# Patient Record
Sex: MALE | Race: WHITE | Employment: OTHER | URBAN - NONMETROPOLITAN AREA
[De-identification: names, ages, dates, MRNs, and addresses within clinical notes are randomized per-mention and may not be internally consistent; named-entity substitution may affect disease eponyms.]

---

## 2018-04-06 ENCOUNTER — HOSPITAL ENCOUNTER (OUTPATIENT)
Age: 58
Discharge: HOME | End: 2018-04-06
Payer: COMMERCIAL

## 2018-04-06 LAB
ALBUMIN SERPL-MCNC: 4 G/DL (ref 3.5–5.1)
ALP BLD-CCNC: 71 U/L (ref 38–126)
ALT SERPL-CCNC: 19 U/L (ref 11–66)
ANION GAP SERPL CALCULATED.3IONS-SCNC: 13 MEQ/L (ref 8–16)
AST SERPL-CCNC: 17 U/L (ref 5–40)
BASOPHILS # BLD: 0.5 %
BASOPHILS ABSOLUTE: 0 THOU/MM3 (ref 0–0.1)
BILIRUB SERPL-MCNC: 0.7 MG/DL (ref 0.3–1.2)
BILIRUBIN DIRECT: < 0.2 MG/DL (ref 0–0.3)
BUN BLDV-MCNC: 12 MG/DL (ref 7–22)
CALCIUM SERPL-MCNC: 8.9 MG/DL (ref 8.5–10.5)
CHLORIDE BLD-SCNC: 102 MEQ/L (ref 98–111)
CHOLESTEROL, FASTING: 235 MG/DL (ref 100–199)
CO2: 28 MEQ/L (ref 23–33)
CREAT SERPL-MCNC: 0.9 MG/DL (ref 0.4–1.2)
EOSINOPHIL # BLD: 2.2 %
EOSINOPHILS ABSOLUTE: 0.1 THOU/MM3 (ref 0–0.4)
GFR SERPL CREATININE-BSD FRML MDRD: 86 ML/MIN/1.73M2
GLUCOSE FASTING: 90 MG/DL (ref 70–108)
HCT VFR BLD CALC: 48.5 % (ref 42–52)
HDLC SERPL-MCNC: 40 MG/DL
HEMOGLOBIN: 16.4 GM/DL (ref 14–18)
LDL CHOLESTEROL CALCULATED: 162 MG/DL
LYMPHOCYTES # BLD: 28.8 %
LYMPHOCYTES ABSOLUTE: 1.5 THOU/MM3 (ref 1–4.8)
MCH RBC QN AUTO: 29.1 PG (ref 27–31)
MCHC RBC AUTO-ENTMCNC: 33.8 GM/DL (ref 33–37)
MCV RBC AUTO: 85.9 FL (ref 80–94)
MONOCYTES # BLD: 10.7 %
MONOCYTES ABSOLUTE: 0.6 THOU/MM3 (ref 0.4–1.3)
NUCLEATED RED BLOOD CELLS: 0 /100 WBC
PDW BLD-RTO: 13.6 % (ref 11.5–14.5)
PLATELET # BLD: 273 THOU/MM3 (ref 130–400)
PMV BLD AUTO: 9.5 FL (ref 7.4–10.4)
POTASSIUM SERPL-SCNC: 4.1 MEQ/L (ref 3.5–5.2)
PROSTATE SPECIFIC ANTIGEN: 1.54 NG/ML (ref 0–1)
RBC # BLD: 5.64 MILL/MM3 (ref 4.7–6.1)
SEG NEUTROPHILS: 57.8 %
SEGMENTED NEUTROPHILS ABSOLUTE COUNT: 3.1 THOU/MM3 (ref 1.8–7.7)
SODIUM BLD-SCNC: 143 MEQ/L (ref 135–145)
TOTAL PROTEIN: 7 G/DL (ref 6.1–8)
TRIGLYCERIDE, FASTING: 163 MG/DL (ref 0–199)
WBC # BLD: 5.3 THOU/MM3 (ref 4.8–10.8)

## 2018-04-06 PROCEDURE — 36415 COLL VENOUS BLD VENIPUNCTURE: CPT

## 2018-04-06 PROCEDURE — G0103 PSA SCREENING: HCPCS

## 2018-04-06 PROCEDURE — 80076 HEPATIC FUNCTION PANEL: CPT

## 2018-04-06 PROCEDURE — 80061 LIPID PANEL: CPT

## 2018-04-06 PROCEDURE — 80048 BASIC METABOLIC PNL TOTAL CA: CPT

## 2018-04-06 PROCEDURE — 85025 COMPLETE CBC W/AUTO DIFF WBC: CPT

## 2020-09-02 ENCOUNTER — OFFICE VISIT (OUTPATIENT)
Dept: SURGERY | Age: 60
End: 2020-09-02
Payer: COMMERCIAL

## 2020-09-02 ENCOUNTER — HOSPITAL ENCOUNTER (OUTPATIENT)
Age: 60
Setting detail: SPECIMEN
Discharge: HOME | End: 2020-09-02
Payer: COMMERCIAL

## 2020-09-02 VITALS
DIASTOLIC BLOOD PRESSURE: 80 MMHG | HEIGHT: 69 IN | TEMPERATURE: 97.8 F | SYSTOLIC BLOOD PRESSURE: 122 MMHG | BODY MASS INDEX: 26.48 KG/M2 | OXYGEN SATURATION: 100 % | HEART RATE: 79 BPM | RESPIRATION RATE: 18 BRPM | WEIGHT: 178.8 LBS

## 2020-09-02 PROCEDURE — U0003 INFECTIOUS AGENT DETECTION BY NUCLEIC ACID (DNA OR RNA); SEVERE ACUTE RESPIRATORY SYNDROME CORONAVIRUS 2 (SARS-COV-2) (CORONAVIRUS DISEASE [COVID-19]), AMPLIFIED PROBE TECHNIQUE, MAKING USE OF HIGH THROUGHPUT TECHNOLOGIES AS DESCRIBED BY CMS-2020-01-R: HCPCS

## 2020-09-02 PROCEDURE — 99243 OFF/OP CNSLTJ NEW/EST LOW 30: CPT | Performed by: SURGERY

## 2020-09-02 RX ORDER — SIMVASTATIN 20 MG
20 TABLET ORAL NIGHTLY
COMMUNITY
End: 2022-04-19 | Stop reason: ALTCHOICE

## 2020-09-03 ASSESSMENT — ENCOUNTER SYMPTOMS
PHOTOPHOBIA: 0
STRIDOR: 0
ABDOMINAL DISTENTION: 0
RECTAL PAIN: 0
BLOOD IN STOOL: 0
ABDOMINAL PAIN: 1
ANAL BLEEDING: 0
DIARRHEA: 0
EYE REDNESS: 0
VOMITING: 0
VOICE CHANGE: 0
EYE DISCHARGE: 0
BACK PAIN: 0
EYE PAIN: 0
CHOKING: 0
CONSTIPATION: 0
COUGH: 0
SHORTNESS OF BREATH: 0
COLOR CHANGE: 0
ALLERGIC/IMMUNOLOGIC NEGATIVE: 1
FACIAL SWELLING: 0
EYE ITCHING: 0
RHINORRHEA: 0
TROUBLE SWALLOWING: 0
SINUS PRESSURE: 0
WHEEZING: 0
NAUSEA: 0
CHEST TIGHTNESS: 0
SORE THROAT: 0
APNEA: 0

## 2020-09-03 NOTE — PROGRESS NOTES
Subjective:      Patient ID: Jey Bush is a 56 y.o. male. Chief Complaint   Patient presents with    Surgical Consult     New patient-referred by Dr Ammon Osorio hernia     HPI  Nitza Burgos is a 78-year-old male who presents for initial evaluation of a periumbilical bulge with discomfort. Has a known incarcerated umbilical hernia. The bulge is been there for a little bit over a year. It is gradually increased in size. No longer reducible. Tender with increased activity, lifting and bending over. Improves with rest.  No skin changes. No history of trauma to the area. No drainage. No history of infection. He denies any surgery on the abdominal wall that he remembers. Tolerating diet. Normal bowel function. No hematochezia or melena. No urinary complaints. No chest pain or shortness of breath. Review of Systems   Constitutional: Negative for activity change, appetite change, chills, diaphoresis, fatigue, fever and unexpected weight change. HENT: Negative for congestion, dental problem, drooling, ear discharge, ear pain, facial swelling, hearing loss, mouth sores, nosebleeds, postnasal drip, rhinorrhea, sinus pressure, sneezing, sore throat, tinnitus, trouble swallowing and voice change. Eyes: Negative for photophobia, pain, discharge, redness, itching and visual disturbance. Respiratory: Negative for apnea, cough, choking, chest tightness, shortness of breath, wheezing and stridor. Cardiovascular: Negative for chest pain, palpitations and leg swelling. Gastrointestinal: Positive for abdominal pain (umbilical). Negative for abdominal distention, anal bleeding, blood in stool, constipation, diarrhea, nausea, rectal pain and vomiting. Endocrine: Negative.     Genitourinary: Negative for decreased urine volume, difficulty urinating, discharge, dysuria, enuresis, flank pain, frequency, genital sores, hematuria, penile pain, penile swelling, scrotal swelling, testicular pain and urgency. Musculoskeletal: Negative for arthralgias, back pain, gait problem, joint swelling, myalgias, neck pain and neck stiffness. Skin: Negative for color change, pallor, rash and wound. Allergic/Immunologic: Negative. Neurological: Negative for dizziness, tremors, seizures, syncope, facial asymmetry, speech difficulty, weakness, light-headedness, numbness and headaches. Hematological: Negative for adenopathy. Does not bruise/bleed easily. Psychiatric/Behavioral: Negative for agitation, behavioral problems, confusion, decreased concentration, dysphoric mood, hallucinations, self-injury, sleep disturbance and suicidal ideas. The patient is not nervous/anxious and is not hyperactive. Past Medical History:   Diagnosis Date    Hyperlipidemia     Umbilical hernia 52/4295       Past Surgical History:   Procedure Laterality Date    [facility]    HEMORRHOID SURGERY  2005    Dr Rollen Bernheim       Current Outpatient Medications   Medication Sig Dispense Refill    simvastatin (ZOCOR) 20 MG tablet Take 20 mg by mouth nightly       No current facility-administered medications for this visit.         No Known Allergies    Family History   Problem Relation Age of Onset    Dementia Mother     No Known Problems Father     No Known Problems Sister     No Known Problems Brother     No Known Problems Maternal Grandmother     No Known Problems Maternal Grandfather     No Known Problems Paternal Grandmother     No Known Problems Paternal Grandfather     No Known Problems Brother     No Known Problems Sister     No Known Problems Sister     No Known Problems Sister        Social History     Socioeconomic History    Marital status:      Spouse name: Not on file    Number of children: Not on file    Years of education: Not on file    Highest education level: Not on file   Occupational History    Not on file   Social Needs    Financial resource strain: Not on file    Food insecurity     Worry: Not on file     Inability: Not on file    Transportation needs     Medical: Not on file     Non-medical: Not on file   Tobacco Use    Smoking status: Never Smoker    Smokeless tobacco: Never Used   Substance and Sexual Activity    Alcohol use: Yes     Comment: occ drinker    Drug use: Never    Sexual activity: Not on file   Lifestyle    Physical activity     Days per week: Not on file     Minutes per session: Not on file    Stress: Not on file   Relationships    Social connections     Talks on phone: Not on file     Gets together: Not on file     Attends Judaism service: Not on file     Active member of club or organization: Not on file     Attends meetings of clubs or organizations: Not on file     Relationship status: Not on file    Intimate partner violence     Fear of current or ex partner: Not on file     Emotionally abused: Not on file     Physically abused: Not on file     Forced sexual activity: Not on file   Other Topics Concern    Not on file   Social History Narrative    Not on file     Vitals:    09/02/20 1405   BP: 122/80   Pulse: 79   Resp: 18   Temp: 97.8 °F (36.6 °C)   SpO2: 100%     Body mass index is 26.4 kg/m². Objective:   Physical Exam  Vitals signs reviewed. Constitutional:       General: He is not in acute distress. Appearance: He is well-developed. He is not diaphoretic. HENT:      Head: Normocephalic and atraumatic. Right Ear: External ear normal.      Left Ear: External ear normal.      Nose: Nose normal.   Eyes:      General: No scleral icterus. Right eye: No discharge. Left eye: No discharge. Conjunctiva/sclera: Conjunctivae normal.   Neck:      Musculoskeletal: Normal range of motion and neck supple. Cardiovascular:      Rate and Rhythm: Normal rate and regular rhythm. Heart sounds: Normal heart sounds.    Pulmonary:      Effort: Pulmonary effort is normal. No respiratory distress. Breath sounds: Normal breath sounds. No wheezing or rales. Chest:      Chest wall: No tenderness. Abdominal:      General: Bowel sounds are normal. There is no distension. Palpations: Abdomen is soft. There is no mass. Tenderness: There is abdominal tenderness in the periumbilical area. There is no guarding or rebound. Hernia: A hernia is present. Hernia is present in the umbilical area. Musculoskeletal: Normal range of motion. General: No tenderness. Skin:     General: Skin is warm and dry. Coloration: Skin is not pale. Findings: No erythema or rash. Neurological:      Mental Status: He is alert and oriented to person, place, and time. Cranial Nerves: No cranial nerve deficit. Psychiatric:         Behavior: Behavior normal.         Thought Content: Thought content normal.         Judgment: Judgment normal.       Lab Results   Component Value Date    WBC 5.3 04/06/2018    HGB 16.4 04/06/2018    HCT 48.5 04/06/2018    MCV 85.9 04/06/2018     04/06/2018     Lab Results   Component Value Date     04/06/2018    K 4.1 04/06/2018     04/06/2018    CO2 28 04/06/2018    BUN 12 04/06/2018    CREATININE 0.9 04/06/2018    CALCIUM 8.9 04/06/2018      Imaging - none    There is no problem list on file for this patient. Assessment:      1. Incarcerated umbilical hernia      Plan:      1. Schedule Jarad for repair incarcerated umbilical hernia with mesh. 2. He will undergo pre-operative clearance per anesthesia guidelines with risk factors listed under the past medical history diagnosis & problem list.  3. The risks, benefits and alternatives were discussed with Jarad including non-operative management. The pros and cons of robotic, laparoscopic and open techniques were discussed. The pros and cons of mesh insertion were discussed. All questions answered. He understands and wishes to proceed with surgical intervention.   4. Restrictions discussed with Tray Eddy and he expresses understanding. 5. He is advised to call back directly if there are further questions/concerns, or if his symptoms worsen prior to surgery.           Tosha Spencer MD

## 2020-09-04 LAB — SARS-COV-2: NOT DETECTED

## 2020-09-06 NOTE — H&P
Subjective:   Patient ID: Kamran Lopez is a 56 y.o. male. Chief Complaint   Patient presents with    Surgical Consult     New patient-referred by Dr Brooke Kruger hernia   HPI   Eugenia Menard is a 24-year-old male who presents for initial evaluation of a periumbilical bulge with discomfort. Has a known incarcerated umbilical hernia. The bulge is been there for a little bit over a year. It is gradually increased in size. No longer reducible. Tender with increased activity, lifting and bending over. Improves with rest. No skin changes. No history of trauma to the area. No drainage. No history of infection. He denies any surgery on the abdominal wall that he remembers. Tolerating diet. Normal bowel function. No hematochezia or melena. No urinary complaints. No chest pain or shortness of breath. Review of Systems   Constitutional: Negative for activity change, appetite change, chills, diaphoresis, fatigue, fever and unexpected weight change. HENT: Negative for congestion, dental problem, drooling, ear discharge, ear pain, facial swelling, hearing loss, mouth sores, nosebleeds, postnasal drip, rhinorrhea, sinus pressure, sneezing, sore throat, tinnitus, trouble swallowing and voice change. Eyes: Negative for photophobia, pain, discharge, redness, itching and visual disturbance. Respiratory: Negative for apnea, cough, choking, chest tightness, shortness of breath, wheezing and stridor. Cardiovascular: Negative for chest pain, palpitations and leg swelling. Gastrointestinal: Positive for abdominal pain (umbilical). Negative for abdominal distention, anal bleeding, blood in stool, constipation, diarrhea, nausea, rectal pain and vomiting. Endocrine: Negative. Genitourinary: Negative for decreased urine volume, difficulty urinating, discharge, dysuria, enuresis, flank pain, frequency, genital sores, hematuria, penile pain, penile swelling, scrotal swelling, testicular pain and urgency. Musculoskeletal: Negative for arthralgias, back pain, gait problem, joint swelling, myalgias, neck pain and neck stiffness. Skin: Negative for color change, pallor, rash and wound. Allergic/Immunologic: Negative. Neurological: Negative for dizziness, tremors, seizures, syncope, facial asymmetry, speech difficulty, weakness, light-headedness, numbness and headaches. Hematological: Negative for adenopathy. Does not bruise/bleed easily. Psychiatric/Behavioral: Negative for agitation, behavioral problems, confusion, decreased concentration, dysphoric mood, hallucinations, self-injury, sleep disturbance and suicidal ideas. The patient is not nervous/anxious and is not hyperactive. Past Medical History        Past Medical History:   Diagnosis Date    Hyperlipidemia     Umbilical hernia 43/0735     Past Surgical History         Past Surgical History:   Procedure Laterality Date    [facility]    HEMORRHOID SURGERY  2005    Dr Meg Domínguez     Current Facility-Administered Medications          Current Outpatient Medications   Medication Sig Dispense Refill    simvastatin (ZOCOR) 20 MG tablet Take 20 mg by mouth nightly     No current facility-administered medications for this visit.      No Known Allergies   Family History         Family History   Problem Relation Age of Onset    Dementia Mother     No Known Problems Father     No Known Problems Sister     No Known Problems Brother     No Known Problems Maternal Grandmother     No Known Problems Maternal Grandfather     No Known Problems Paternal Grandmother     No Known Problems Paternal Grandfather     No Known Problems Brother     No Known Problems Sister     No Known Problems Sister     No Known Problems Sister      Social History         Socioeconomic History    Marital status:      Spouse name: Not on file    Number of children: Not on file    Years of education: Not on file  Highest education level: Not on file   Occupational History    Not on file   Social Needs    Financial resource strain: Not on file    Food insecurity     Worry: Not on file     Inability: Not on file    Transportation needs     Medical: Not on file     Non-medical: Not on file   Tobacco Use    Smoking status: Never Smoker    Smokeless tobacco: Never Used   Substance and Sexual Activity    Alcohol use: Yes     Comment: occ drinker    Drug use: Never    Sexual activity: Not on file   Lifestyle    Physical activity     Days per week: Not on file     Minutes per session: Not on file    Stress: Not on file   Relationships    Social connections     Talks on phone: Not on file     Gets together: Not on file     Attends Congregational service: Not on file     Active member of club or organization: Not on file     Attends meetings of clubs or organizations: Not on file     Relationship status: Not on file    Intimate partner violence     Fear of current or ex partner: Not on file     Emotionally abused: Not on file     Physically abused: Not on file     Forced sexual activity: Not on file   Other Topics Concern    Not on file   Social History Narrative    Not on file         Vitals:    09/02/20 1405   BP: 122/80   Pulse: 79   Resp: 18   Temp: 97.8 °F (36.6 °C)   SpO2: 100%   Body mass index is 26.4 kg/m². Objective:   Physical Exam   Vitals signs reviewed. Constitutional:   General: He is not in acute distress. Appearance: He is well-developed. He is not diaphoretic. HENT:   Head: Normocephalic and atraumatic. Right Ear: External ear normal.   Left Ear: External ear normal.   Nose: Nose normal.   Eyes:   General: No scleral icterus. Right eye: No discharge. Left eye: No discharge. Conjunctiva/sclera: Conjunctivae normal.   Neck:   Musculoskeletal: Normal range of motion and neck supple. Cardiovascular:   Rate and Rhythm: Normal rate and regular rhythm. Heart sounds: Normal heart sounds. Pulmonary:   Effort: Pulmonary effort is normal. No respiratory distress. Breath sounds: Normal breath sounds. No wheezing or rales. Chest:   Chest wall: No tenderness. Abdominal:   General: Bowel sounds are normal. There is no distension. Palpations: Abdomen is soft. There is no mass. Tenderness: There is abdominal tenderness in the periumbilical area. There is no guarding or rebound. Hernia: A hernia is present. Hernia is present in the umbilical area. Musculoskeletal: Normal range of motion. General: No tenderness. Skin:   General: Skin is warm and dry. Coloration: Skin is not pale. Findings: No erythema or rash. Neurological:   Mental Status: He is alert and oriented to person, place, and time. Cranial Nerves: No cranial nerve deficit. Psychiatric:   Behavior: Behavior normal.   Thought Content: Thought content normal.   Judgment: Judgment normal.           Lab Results   Component Value Date    WBC 5.3 04/06/2018    HGB 16.4 04/06/2018    HCT 48.5 04/06/2018    MCV 85.9 04/06/2018     04/06/2018           Lab Results   Component Value Date     04/06/2018    K 4.1 04/06/2018     04/06/2018    CO2 28 04/06/2018    BUN 12 04/06/2018    CREATININE 0.9 04/06/2018    CALCIUM 8.9 04/06/2018   Imaging - none   There is no problem list on file for this patient. Assessment:   1. Incarcerated umbilical hernia   Plan:   1. Schedule Jarad for repair incarcerated umbilical hernia with mesh. 2. He will undergo pre-operative clearance per anesthesia guidelines with risk factors listed under the past medical history diagnosis & problem list.   3. The risks, benefits and alternatives were discussed with Jarad including non-operative management. The pros and cons of robotic, laparoscopic and open techniques were discussed. The pros and cons of mesh insertion were discussed. All questions answered. He understands and wishes to proceed with surgical intervention.    4. Restrictions discussed with Wesly Chan and he expresses understanding. 5. He is advised to call back directly if there are further questions/concerns, or if his symptoms worsen prior to surgery.    Iggy Damico MD

## 2020-09-08 ENCOUNTER — HOSPITAL ENCOUNTER (OUTPATIENT)
Age: 60
Setting detail: OUTPATIENT SURGERY
Discharge: HOME | End: 2020-09-08
Attending: SURGERY | Admitting: SURGERY
Payer: COMMERCIAL

## 2020-09-08 ENCOUNTER — ANESTHESIA (OUTPATIENT)
Dept: OPERATING ROOM | Age: 60
End: 2020-09-08
Payer: COMMERCIAL

## 2020-09-08 ENCOUNTER — ANESTHESIA EVENT (OUTPATIENT)
Dept: OPERATING ROOM | Age: 60
End: 2020-09-08
Payer: COMMERCIAL

## 2020-09-08 VITALS
DIASTOLIC BLOOD PRESSURE: 92 MMHG | SYSTOLIC BLOOD PRESSURE: 150 MMHG | TEMPERATURE: 95.2 F | OXYGEN SATURATION: 98 % | RESPIRATION RATE: 9 BRPM

## 2020-09-08 VITALS
HEART RATE: 50 BPM | SYSTOLIC BLOOD PRESSURE: 149 MMHG | OXYGEN SATURATION: 99 % | WEIGHT: 174.6 LBS | HEIGHT: 69 IN | RESPIRATION RATE: 16 BRPM | TEMPERATURE: 98.4 F | BODY MASS INDEX: 25.86 KG/M2 | DIASTOLIC BLOOD PRESSURE: 79 MMHG

## 2020-09-08 PROCEDURE — 3600000012 HC SURGERY LEVEL 2 ADDTL 15MIN: Performed by: SURGERY

## 2020-09-08 PROCEDURE — 49587 REPAIR UMBILICAL HERN,5+Y/O,STRANG: CPT | Performed by: SURGERY

## 2020-09-08 PROCEDURE — 2500000003 HC RX 250 WO HCPCS: Performed by: SURGERY

## 2020-09-08 PROCEDURE — 6360000002 HC RX W HCPCS: Performed by: NURSE ANESTHETIST, CERTIFIED REGISTERED

## 2020-09-08 PROCEDURE — 6360000002 HC RX W HCPCS: Performed by: SURGERY

## 2020-09-08 PROCEDURE — 3700000000 HC ANESTHESIA ATTENDED CARE: Performed by: SURGERY

## 2020-09-08 PROCEDURE — 2580000003 HC RX 258: Performed by: SURGERY

## 2020-09-08 PROCEDURE — 2580000003 HC RX 258: Performed by: NURSE ANESTHETIST, CERTIFIED REGISTERED

## 2020-09-08 PROCEDURE — C1781 MESH (IMPLANTABLE): HCPCS | Performed by: SURGERY

## 2020-09-08 PROCEDURE — 2500000003 HC RX 250 WO HCPCS: Performed by: NURSE ANESTHETIST, CERTIFIED REGISTERED

## 2020-09-08 PROCEDURE — 7100000000 HC PACU RECOVERY - FIRST 15 MIN: Performed by: SURGERY

## 2020-09-08 PROCEDURE — 2709999900 HC NON-CHARGEABLE SUPPLY: Performed by: SURGERY

## 2020-09-08 PROCEDURE — 3600000002 HC SURGERY LEVEL 2 BASE: Performed by: SURGERY

## 2020-09-08 PROCEDURE — 7100000011 HC PHASE II RECOVERY - ADDTL 15 MIN: Performed by: SURGERY

## 2020-09-08 PROCEDURE — 7100000010 HC PHASE II RECOVERY - FIRST 15 MIN: Performed by: SURGERY

## 2020-09-08 PROCEDURE — 6360000002 HC RX W HCPCS

## 2020-09-08 PROCEDURE — 6360000002 HC RX W HCPCS: Performed by: ANESTHESIOLOGY

## 2020-09-08 PROCEDURE — 3700000001 HC ADD 15 MINUTES (ANESTHESIA): Performed by: SURGERY

## 2020-09-08 PROCEDURE — 7100000001 HC PACU RECOVERY - ADDTL 15 MIN: Performed by: SURGERY

## 2020-09-08 DEVICE — IMPLANTABLE DEVICE: Type: IMPLANTABLE DEVICE | Site: ABDOMEN | Status: FUNCTIONAL

## 2020-09-08 RX ORDER — NEOSTIGMINE METHYLSULFATE 5 MG/5 ML
SYRINGE (ML) INTRAVENOUS PRN
Status: DISCONTINUED | OUTPATIENT
Start: 2020-09-08 | End: 2020-09-08 | Stop reason: SDUPTHER

## 2020-09-08 RX ORDER — MORPHINE SULFATE 2 MG/ML
4 INJECTION, SOLUTION INTRAMUSCULAR; INTRAVENOUS
Status: DISCONTINUED | OUTPATIENT
Start: 2020-09-08 | End: 2020-09-08 | Stop reason: HOSPADM

## 2020-09-08 RX ORDER — PROMETHAZINE HYDROCHLORIDE 25 MG/1
12.5 TABLET ORAL EVERY 6 HOURS PRN
Status: DISCONTINUED | OUTPATIENT
Start: 2020-09-08 | End: 2020-09-08 | Stop reason: HOSPADM

## 2020-09-08 RX ORDER — MEPERIDINE HYDROCHLORIDE 25 MG/ML
12.5 INJECTION INTRAMUSCULAR; INTRAVENOUS; SUBCUTANEOUS EVERY 5 MIN PRN
Status: DISCONTINUED | OUTPATIENT
Start: 2020-09-08 | End: 2020-09-08 | Stop reason: HOSPADM

## 2020-09-08 RX ORDER — DIPHENHYDRAMINE HYDROCHLORIDE 50 MG/ML
12.5 INJECTION INTRAMUSCULAR; INTRAVENOUS
Status: DISCONTINUED | OUTPATIENT
Start: 2020-09-08 | End: 2020-09-08 | Stop reason: HOSPADM

## 2020-09-08 RX ORDER — MIDAZOLAM HYDROCHLORIDE 1 MG/ML
INJECTION INTRAMUSCULAR; INTRAVENOUS PRN
Status: DISCONTINUED | OUTPATIENT
Start: 2020-09-08 | End: 2020-09-08 | Stop reason: SDUPTHER

## 2020-09-08 RX ORDER — LABETALOL 20 MG/4 ML (5 MG/ML) INTRAVENOUS SYRINGE
5 EVERY 5 MIN PRN
Status: DISCONTINUED | OUTPATIENT
Start: 2020-09-08 | End: 2020-09-08 | Stop reason: HOSPADM

## 2020-09-08 RX ORDER — PROPOFOL 10 MG/ML
INJECTION, EMULSION INTRAVENOUS PRN
Status: DISCONTINUED | OUTPATIENT
Start: 2020-09-08 | End: 2020-09-08 | Stop reason: SDUPTHER

## 2020-09-08 RX ORDER — KETOROLAC TROMETHAMINE 10 MG/1
10 TABLET, FILM COATED ORAL EVERY 8 HOURS PRN
Qty: 15 TABLET | Refills: 0 | Status: SHIPPED | OUTPATIENT
Start: 2020-09-08 | End: 2020-09-21 | Stop reason: ALTCHOICE

## 2020-09-08 RX ORDER — OXYCODONE HYDROCHLORIDE 5 MG/1
5 TABLET ORAL EVERY 4 HOURS PRN
Status: DISCONTINUED | OUTPATIENT
Start: 2020-09-08 | End: 2020-09-08 | Stop reason: HOSPADM

## 2020-09-08 RX ORDER — MORPHINE SULFATE 2 MG/ML
2 INJECTION, SOLUTION INTRAMUSCULAR; INTRAVENOUS EVERY 5 MIN PRN
Status: DISCONTINUED | OUTPATIENT
Start: 2020-09-08 | End: 2020-09-08 | Stop reason: HOSPADM

## 2020-09-08 RX ORDER — FENTANYL CITRATE 50 UG/ML
50 INJECTION, SOLUTION INTRAMUSCULAR; INTRAVENOUS EVERY 5 MIN PRN
Status: DISCONTINUED | OUTPATIENT
Start: 2020-09-08 | End: 2020-09-08 | Stop reason: HOSPADM

## 2020-09-08 RX ORDER — LIDOCAINE HCL/PF 100 MG/5ML
SYRINGE (ML) INJECTION PRN
Status: DISCONTINUED | OUTPATIENT
Start: 2020-09-08 | End: 2020-09-08 | Stop reason: SDUPTHER

## 2020-09-08 RX ORDER — OXYCODONE HYDROCHLORIDE 5 MG/1
10 TABLET ORAL EVERY 4 HOURS PRN
Status: DISCONTINUED | OUTPATIENT
Start: 2020-09-08 | End: 2020-09-08 | Stop reason: HOSPADM

## 2020-09-08 RX ORDER — GLYCOPYRROLATE 1 MG/5 ML
SYRINGE (ML) INTRAVENOUS PRN
Status: DISCONTINUED | OUTPATIENT
Start: 2020-09-08 | End: 2020-09-08 | Stop reason: SDUPTHER

## 2020-09-08 RX ORDER — ONDANSETRON 2 MG/ML
4 INJECTION INTRAMUSCULAR; INTRAVENOUS EVERY 6 HOURS PRN
Status: DISCONTINUED | OUTPATIENT
Start: 2020-09-08 | End: 2020-09-08 | Stop reason: HOSPADM

## 2020-09-08 RX ORDER — SODIUM CHLORIDE 9 MG/ML
INJECTION, SOLUTION INTRAVENOUS CONTINUOUS PRN
Status: DISCONTINUED | OUTPATIENT
Start: 2020-09-08 | End: 2020-09-08 | Stop reason: SDUPTHER

## 2020-09-08 RX ORDER — ONDANSETRON 2 MG/ML
4 INJECTION INTRAMUSCULAR; INTRAVENOUS
Status: DISCONTINUED | OUTPATIENT
Start: 2020-09-08 | End: 2020-09-08 | Stop reason: HOSPADM

## 2020-09-08 RX ORDER — HYDROCODONE BITARTRATE AND ACETAMINOPHEN 5; 325 MG/1; MG/1
1-2 TABLET ORAL EVERY 6 HOURS PRN
Qty: 20 TABLET | Refills: 0 | Status: SHIPPED | OUTPATIENT
Start: 2020-09-08 | End: 2020-09-11

## 2020-09-08 RX ORDER — ONDANSETRON 2 MG/ML
INJECTION INTRAMUSCULAR; INTRAVENOUS PRN
Status: DISCONTINUED | OUTPATIENT
Start: 2020-09-08 | End: 2020-09-08 | Stop reason: SDUPTHER

## 2020-09-08 RX ORDER — FENTANYL CITRATE 50 UG/ML
INJECTION, SOLUTION INTRAMUSCULAR; INTRAVENOUS PRN
Status: DISCONTINUED | OUTPATIENT
Start: 2020-09-08 | End: 2020-09-08 | Stop reason: SDUPTHER

## 2020-09-08 RX ORDER — DEXAMETHASONE SODIUM PHOSPHATE 4 MG/ML
INJECTION, SOLUTION INTRA-ARTICULAR; INTRALESIONAL; INTRAMUSCULAR; INTRAVENOUS; SOFT TISSUE PRN
Status: DISCONTINUED | OUTPATIENT
Start: 2020-09-08 | End: 2020-09-08 | Stop reason: SDUPTHER

## 2020-09-08 RX ORDER — CEFAZOLIN SODIUM 1 G/50ML
1 INJECTION, SOLUTION INTRAVENOUS
Status: COMPLETED | OUTPATIENT
Start: 2020-09-08 | End: 2020-09-08

## 2020-09-08 RX ORDER — MORPHINE SULFATE 2 MG/ML
INJECTION, SOLUTION INTRAMUSCULAR; INTRAVENOUS
Status: COMPLETED
Start: 2020-09-08 | End: 2020-09-08

## 2020-09-08 RX ORDER — HYDRALAZINE HYDROCHLORIDE 20 MG/ML
5 INJECTION INTRAMUSCULAR; INTRAVENOUS EVERY 10 MIN PRN
Status: DISCONTINUED | OUTPATIENT
Start: 2020-09-08 | End: 2020-09-08 | Stop reason: HOSPADM

## 2020-09-08 RX ORDER — BUPIVACAINE HYDROCHLORIDE 5 MG/ML
INJECTION, SOLUTION EPIDURAL; INTRACAUDAL PRN
Status: DISCONTINUED | OUTPATIENT
Start: 2020-09-08 | End: 2020-09-08 | Stop reason: ALTCHOICE

## 2020-09-08 RX ORDER — ROCURONIUM BROMIDE 10 MG/ML
INJECTION, SOLUTION INTRAVENOUS PRN
Status: DISCONTINUED | OUTPATIENT
Start: 2020-09-08 | End: 2020-09-08 | Stop reason: SDUPTHER

## 2020-09-08 RX ORDER — SODIUM CHLORIDE 0.9 % (FLUSH) 0.9 %
10 SYRINGE (ML) INJECTION PRN
Status: DISCONTINUED | OUTPATIENT
Start: 2020-09-08 | End: 2020-09-08 | Stop reason: HOSPADM

## 2020-09-08 RX ORDER — KETOROLAC TROMETHAMINE 30 MG/ML
INJECTION, SOLUTION INTRAMUSCULAR; INTRAVENOUS PRN
Status: DISCONTINUED | OUTPATIENT
Start: 2020-09-08 | End: 2020-09-08 | Stop reason: SDUPTHER

## 2020-09-08 RX ORDER — SODIUM CHLORIDE 0.9 % (FLUSH) 0.9 %
10 SYRINGE (ML) INJECTION EVERY 12 HOURS SCHEDULED
Status: DISCONTINUED | OUTPATIENT
Start: 2020-09-08 | End: 2020-09-08 | Stop reason: HOSPADM

## 2020-09-08 RX ORDER — MORPHINE SULFATE 2 MG/ML
2 INJECTION, SOLUTION INTRAMUSCULAR; INTRAVENOUS
Status: DISCONTINUED | OUTPATIENT
Start: 2020-09-08 | End: 2020-09-08 | Stop reason: HOSPADM

## 2020-09-08 RX ORDER — SODIUM CHLORIDE 9 MG/ML
INJECTION, SOLUTION INTRAVENOUS CONTINUOUS
Status: DISCONTINUED | OUTPATIENT
Start: 2020-09-08 | End: 2020-09-08 | Stop reason: HOSPADM

## 2020-09-08 RX ADMIN — SODIUM CHLORIDE: 9 INJECTION, SOLUTION INTRAVENOUS at 10:12

## 2020-09-08 RX ADMIN — CEFAZOLIN SODIUM 2 G: 1 INJECTION, SOLUTION INTRAVENOUS at 10:27

## 2020-09-08 RX ADMIN — MIDAZOLAM HYDROCHLORIDE 2 MG: 1 INJECTION, SOLUTION INTRAMUSCULAR; INTRAVENOUS at 10:13

## 2020-09-08 RX ADMIN — ONDANSETRON HYDROCHLORIDE 4 MG: 4 INJECTION, SOLUTION INTRAMUSCULAR; INTRAVENOUS at 10:21

## 2020-09-08 RX ADMIN — SODIUM CHLORIDE: 9 INJECTION, SOLUTION INTRAVENOUS at 10:39

## 2020-09-08 RX ADMIN — KETOROLAC TROMETHAMINE 30 MG: 30 INJECTION, SOLUTION INTRAMUSCULAR at 10:49

## 2020-09-08 RX ADMIN — MORPHINE SULFATE 2 MG: 2 INJECTION, SOLUTION INTRAMUSCULAR; INTRAVENOUS at 11:10

## 2020-09-08 RX ADMIN — MORPHINE SULFATE 2 MG: 2 INJECTION, SOLUTION INTRAMUSCULAR; INTRAVENOUS at 11:15

## 2020-09-08 RX ADMIN — PROPOFOL 200 MG: 10 INJECTION, EMULSION INTRAVENOUS at 10:16

## 2020-09-08 RX ADMIN — DEXAMETHASONE SODIUM PHOSPHATE 8 MG: 4 INJECTION, SOLUTION INTRAMUSCULAR; INTRAVENOUS at 10:21

## 2020-09-08 RX ADMIN — Medication 4 MG: at 10:50

## 2020-09-08 RX ADMIN — SODIUM CHLORIDE: 9 INJECTION, SOLUTION INTRAVENOUS at 09:54

## 2020-09-08 RX ADMIN — FENTANYL CITRATE 100 MCG: 50 INJECTION, SOLUTION INTRAMUSCULAR; INTRAVENOUS at 10:13

## 2020-09-08 RX ADMIN — Medication 100 MG: at 10:16

## 2020-09-08 RX ADMIN — Medication 0.6 MG: at 10:50

## 2020-09-08 RX ADMIN — ROCURONIUM BROMIDE 40 MG: 10 INJECTION INTRAVENOUS at 10:16

## 2020-09-08 ASSESSMENT — PULMONARY FUNCTION TESTS
PIF_VALUE: 24
PIF_VALUE: 16
PIF_VALUE: 11
PIF_VALUE: 1
PIF_VALUE: 13
PIF_VALUE: 12
PIF_VALUE: 13
PIF_VALUE: 14
PIF_VALUE: 3
PIF_VALUE: 12
PIF_VALUE: 2
PIF_VALUE: 13
PIF_VALUE: 14
PIF_VALUE: 12
PIF_VALUE: 12
PIF_VALUE: 13
PIF_VALUE: 13
PIF_VALUE: 12
PIF_VALUE: 13
PIF_VALUE: 13
PIF_VALUE: 11
PIF_VALUE: 13
PIF_VALUE: 23
PIF_VALUE: 13
PIF_VALUE: 12
PIF_VALUE: 13
PIF_VALUE: 12
PIF_VALUE: 12
PIF_VALUE: 3
PIF_VALUE: 11
PIF_VALUE: 5
PIF_VALUE: 3
PIF_VALUE: 3
PIF_VALUE: 14

## 2020-09-08 ASSESSMENT — PAIN DESCRIPTION - DESCRIPTORS: DESCRIPTORS: ACHING;NAGGING

## 2020-09-08 ASSESSMENT — PAIN DESCRIPTION - PAIN TYPE: TYPE: ACUTE PAIN;SURGICAL PAIN

## 2020-09-08 ASSESSMENT — PAIN SCALES - GENERAL
PAINLEVEL_OUTOF10: 1
PAINLEVEL_OUTOF10: 0
PAINLEVEL_OUTOF10: 6

## 2020-09-08 ASSESSMENT — PAIN DESCRIPTION - LOCATION: LOCATION: ABDOMEN

## 2020-09-08 NOTE — PROGRESS NOTES
1107 Awake and oriented on arrival to PACU with simple mask , pt c/o # 5 - 6 ABD pain   1110 O2 off , medicated with morphine 2 mg IV   1115 no change in pain medicated with morphine 2 mg IV   1125 resting on and off resp easy   1140 pt states pain tolerable   1145 meets criteria for discharge , transported to [facility] , wife in room waiting

## 2020-09-08 NOTE — ANESTHESIA POSTPROCEDURE EVALUATION
Department of Anesthesiology  Postprocedure Note    Patient: Jewels Lane  MRN: [id number]  YOB: 1960  Date of evaluation: 9/8/2020  Time:  2:03 PM     Procedure Summary     Date:  09/08/20 Room / Location:  [address] [facility]cal    Anesthesia Start:  1012 Anesthesia Stop:  6582    Procedure:  INCARCERATED UMBILICAL HERNIA REPAIR WITH MESH (N/A Abdomen) Diagnosis:  (INCARCERATED UMBILICAL HERNIA)    Surgeon:  Niya Christina MD Responsible Provider:  Alek Gaxiola MD    Anesthesia Type:  general ASA Status:  2          Anesthesia Type: general    Meghan Phase I: Meghan Score: 10    Meghan Phase II: Meghan Score: 10    Last vitals: Reviewed and per EMR flowsheets.        Anesthesia Post Evaluation

## 2020-09-08 NOTE — BRIEF OP NOTE
Brief Postoperative Note      Patient: Rachelle Bennett  YOB: 1960  MRN: [id number]    Date of Procedure: 9/8/2020    Pre-Op Diagnosis: INCARCERATED UMBILICAL HERNIA    Post-Op Diagnosis: Same       Procedure(s):  INCARCERATED UMBILICAL HERNIA REPAIR WITH MESH    Surgeon(s):  Becki Davis MD    Assistant:  * No surgical staff found *    Anesthesia: General/Local    Estimated Blood Loss (mL): 5 ml    Complications: None    Specimens:   * No specimens in log *    Implants:  Implant Name Type Inv.  Item Serial No.  Lot No. LRB No. Used Action   PATCH AYLEEN VENTRALEX ST W/STRAP CIR SM 4.3CM Mesh PATCH AYLEEN VENTRALEX ST W/STRAP CIR SM 4.3CM  CR [employer] [id number] N/A 1 Implanted         Drains: * No LDAs found *    Findings: as above - see op note for details    Electronically signed by Becki Davis MD on 9/8/2020 at 10:53 AM

## 2020-09-08 NOTE — PROGRESS NOTES
Pt returned to [facility] room 6. Vitals and assessment as charted. 0.9 infusing, @800ml to count from PACU. Pt has sherbert and juice. Family at the bedside. Pt and family verbalized understanding of discharge criteria and call light use. Call light in reach.

## 2020-09-08 NOTE — OP NOTE
correct at the end of  the procedure. The patient tolerated the procedure well with no  apparent complications and less than 5 mL of blood loss. Easily brought  out of general anesthesia and transferred to postanesthesia care unit in  stable condition.         Dior Cannon M.D.    D: 09/08/2020 10:53:29       T: 09/08/2020 11:21:48     [email or web address]  Job#: [id number]     Doc#: [id number]    CC:

## 2020-09-08 NOTE — ANESTHESIA PRE PROCEDURE
Time of last solid consumption: 1830                        Date of last liquid consumption: 09/07/20                        Date of last solid food consumption: 09/07/20    BMI:   Wt Readings from Last 3 Encounters:   09/08/20 174 lb 9.6 oz (79.2 kg)   09/02/20 178 lb 12.8 oz (81.1 kg)     Body mass index is 25.78 kg/m². CBC:   Lab Results   Component Value Date    WBC 5.3 04/06/2018    RBC 5.64 04/06/2018    HGB 16.4 04/06/2018    HCT 48.5 04/06/2018    MCV 85.9 04/06/2018    RDW 13.6 04/06/2018     04/06/2018       CMP:   Lab Results   Component Value Date     04/06/2018    K 4.1 04/06/2018     04/06/2018    CO2 28 04/06/2018    BUN 12 04/06/2018    CREATININE 0.9 04/06/2018    LABGLOM 86 04/06/2018    PROT 7.0 04/06/2018    CALCIUM 8.9 04/06/2018    BILITOT 0.7 04/06/2018    ALKPHOS 71 04/06/2018    AST 17 04/06/2018    ALT 19 04/06/2018       POC Tests: No results for input(s): POCGLU, POCNA, POCK, POCCL, POCBUN, POCHEMO, POCHCT in the last 72 hours. Coags: No results found for: PROTIME, INR, APTT    HCG (If Applicable): No results found for: PREGTESTUR, PREGSERUM, HCG, HCGQUANT     ABGs: No results found for: PHART, PO2ART, LQC8TGG, VHO3GVQ, BEART, Z7XCKAFJ     Type & Screen (If Applicable):  No results found for: LABABO, LABRH    Drug/Infectious Status (If Applicable):  No results found for: HIV, HEPCAB    COVID-19 Screening (If Applicable):   Lab Results   Component Value Date    COVID19 Not Detected 09/02/2020         Anesthesia Evaluation    Airway: Mallampati: II       Mouth opening: > = 3 FB Dental:          Pulmonary:       (-) COPD                           Cardiovascular:        (-) hypertension                Neuro/Psych:               GI/Hepatic/Renal:             Endo/Other:                     Abdominal:           Vascular:                                        Anesthesia Plan      general     ASA 2       Induction: intravenous.       Anesthetic plan and risks discussed with patient. Plan discussed with CRNA.                   Keith Oconnell MD   9/8/2020

## 2020-09-08 NOTE — INTERVAL H&P NOTE
Update History & Physical    The patient's History and Physical was reviewed with the patient and I examined the patient. There was no change. The surgical site was confirmed by the patient and me. Plan: The risks, benefits, expected outcome, and alternative to the recommended procedure have been discussed with the patient. Patient understands and wants to proceed with the procedure. The patient was counseled at length about the risks of brenda Covid-19 during their perioperative period and any recovery window from their procedure. The patient was made aware that brenda Covid-19  may worsen their prognosis for recovering from their procedure  and lend to a higher morbidity and/or mortality risk. All material risks, benefits, and reasonable alternatives including postponing the procedure were discussed. The patient does wish to proceed with the procedure at this time.     Electronically signed by Colleen Vogt MD on 9/8/2020 at 9:37 AM

## 2020-09-08 NOTE — PROGRESS NOTES
Patient admitted to [facility] room 14 with wife at bedside. Bed in low position side rails up call light in reach. Patient denies questions at this time.

## 2020-09-09 ENCOUNTER — TELEPHONE (OUTPATIENT)
Dept: SURGERY | Age: 60
End: 2020-09-09

## 2020-09-09 NOTE — TELEPHONE ENCOUNTER
Called to check on patient post op, pt states he is doing pretty well, woke up sore this morning so took a pain pill. Is only taking pain medication when needed. Pt denies nausea or trouble urinating, is moving around at home without difficulty. Discussed use of ice and showering later today. No further questions or concerns, pt was advised to call the office if any issues arise.

## 2020-09-21 ENCOUNTER — OFFICE VISIT (OUTPATIENT)
Dept: SURGERY | Age: 60
End: 2020-09-21

## 2020-09-21 VITALS
TEMPERATURE: 97.6 F | WEIGHT: 177.2 LBS | HEIGHT: 69 IN | SYSTOLIC BLOOD PRESSURE: 138 MMHG | HEART RATE: 83 BPM | BODY MASS INDEX: 26.25 KG/M2 | RESPIRATION RATE: 18 BRPM | OXYGEN SATURATION: 98 % | DIASTOLIC BLOOD PRESSURE: 62 MMHG

## 2020-09-21 PROBLEM — Z09 S/P UMBILICAL HERNIA REPAIR, FOLLOW-UP EXAM: Status: ACTIVE | Noted: 2020-09-21

## 2020-09-21 PROCEDURE — 99024 POSTOP FOLLOW-UP VISIT: CPT | Performed by: NURSE PRACTITIONER

## 2020-09-21 ASSESSMENT — ENCOUNTER SYMPTOMS
RHINORRHEA: 0
CHOKING: 0
NAUSEA: 0
EYE ITCHING: 0
SHORTNESS OF BREATH: 0
DIARRHEA: 0
COLOR CHANGE: 0
ANAL BLEEDING: 0
BLOOD IN STOOL: 0
VOMITING: 0
ALLERGIC/IMMUNOLOGIC NEGATIVE: 1
COUGH: 0
BACK PAIN: 0
EYE PAIN: 0
EYE DISCHARGE: 0
CONSTIPATION: 0
CHEST TIGHTNESS: 0
SORE THROAT: 0
ABDOMINAL DISTENTION: 0
WHEEZING: 0
APNEA: 0
SINUS PRESSURE: 0
PHOTOPHOBIA: 0
ABDOMINAL PAIN: 0

## 2020-09-21 NOTE — PROGRESS NOTES
[address] [facility] [address] [facility] [address]  Dept: [phone]  Dept Fax: [phone]  Loc: [phone]    Visit Date: 9/21/2020    Geraldine Thomson is a 56 y.o. male who presents today for:  Chief Complaint   Patient presents with    Post-Op Check     s/p Repair of incarcerated umbilical hernia with mesh 9/8/20       HPI:     BOB Mcrae is a 57-year old male patient who presents today for follow up status post repair of incarcerated umbilical hernia with mesh 2 weeks ago. He is off all pain medications. States he has very minimal discomfort. Appetite back to normal. No nausea or vomiting. No fever, chills, or sweats. Incision healing well without signs of infection. No umbilical tenderness. Very mild swelling. Normal induration. No issues urinating. Bowels are back to normal. No stool softener use. No SOB or chest pain. No lightheadedness or dizziness. Tolerating activity well.      Past Medical History:   Diagnosis Date    Hyperlipidemia     Umbilical hernia 13/4952      Past Surgical History:   Procedure Laterality Date    [facility]    HEMORRHOID SURGERY  2005    Dr Desiree Parry    UMBILICAL HERNIA REPAIR N/A 9/8/2020    INCARCERATED UMBILICAL HERNIA REPAIR WITH MESH performed by Alanis Gomes MD at [address] [facility] History   Problem Relation Age of Onset    Dementia Mother     No Known Problems Father     No Known Problems Sister     No Known Problems Brother     No Known Problems Maternal Grandmother     No Known Problems Maternal Grandfather     No Known Problems Paternal Grandmother     No Known Problems Paternal Grandfather     No Known Problems Brother     No Known Problems Sister     No Known Problems Sister     No Known Problems Sister        Social History     Tobacco Use    Smoking status: Never Smoker    Smokeless tobacco: Never Used Substance Use Topics    Alcohol use: Yes     Comment: occ drinker      Current Outpatient Medications   Medication Sig Dispense Refill    simvastatin (ZOCOR) 20 MG tablet Take 20 mg by mouth nightly       No current facility-administered medications for this visit. No Known Allergies    Subjective:     Review of Systems   Constitutional: Negative for activity change, appetite change, chills, diaphoresis, fatigue, fever and unexpected weight change. HENT: Negative for congestion, dental problem, hearing loss, rhinorrhea, sinus pressure and sore throat. Eyes: Negative for photophobia, pain, discharge, itching and visual disturbance. Respiratory: Negative for apnea, cough, choking, chest tightness, shortness of breath and wheezing. Cardiovascular: Negative for chest pain, palpitations and leg swelling. Gastrointestinal: Negative for abdominal distention, abdominal pain, anal bleeding, blood in stool, constipation, diarrhea, nausea and vomiting. Endocrine: Negative. Genitourinary: Negative for decreased urine volume, difficulty urinating, dysuria, frequency and urgency. Musculoskeletal: Negative for arthralgias, back pain, gait problem, joint swelling, myalgias and neck pain. Skin: Negative for color change, pallor, rash and wound. Allergic/Immunologic: Negative. Neurological: Negative for dizziness, tremors, weakness, numbness and headaches. Hematological: Negative. Psychiatric/Behavioral: Negative. Objective:   /62 (Site: Left Upper Arm, Position: Sitting, Cuff Size: Medium Adult)   Pulse 83   Temp 97.6 °F (36.4 °C) (Temporal)   Resp 18   Ht 5' 9\" (1.753 m)   Wt 177 lb 3.2 oz (80.4 kg)   SpO2 98%   BMI 26.17 kg/m²     Physical Exam  Vitals signs reviewed. Constitutional:       General: He is not in acute distress. Appearance: Normal appearance. He is well-developed. He is not ill-appearing or toxic-appearing.    HENT:      Head: Normocephalic and returned  4. Wear abdominal binder for comfort. Off and on as needed throughout the day. 5. Off narcotics. Tylenol as needed for discomfort. 6. Lifting/activity restrictions discussed with patient. Questions answered. 7. Follow up as needed. Signs and symptoms reviewed with patient that would be concerning and need him to return to office for re-evaluation. Patient states he will call if he has questions or concerns.       Electronically signed by LADARIUS Collazo CNP on 9/21/2020 at 12:45 PM

## 2020-09-21 NOTE — PATIENT INSTRUCTIONS
Maintain lifting restrictions for the full 4-6 weeks after surgery (Oct. 5th, 2020 - Oct. 19th, 2020). No heavy lifting, pulling, or tugging greater than 20-25 lbs. Gradually ease into normal routine before lifting heavier objects. Continue stool softeners as needed. Take Colace or Miralax if needed. Follow up as directed. Do not hesitate to call with any questions or concerns.

## 2020-10-21 PROBLEM — Z09 S/P UMBILICAL HERNIA REPAIR, FOLLOW-UP EXAM: Status: RESOLVED | Noted: 2020-09-21 | Resolved: 2020-10-21

## 2021-02-19 ENCOUNTER — NURSE ONLY (OUTPATIENT)
Dept: LAB | Age: 61
End: 2021-02-19

## 2021-03-24 ENCOUNTER — HOSPITAL ENCOUNTER (EMERGENCY)
Age: 61
Discharge: HOME | End: 2021-03-24
Attending: EMERGENCY MEDICINE
Payer: OTHER MISCELLANEOUS

## 2021-03-24 VITALS
OXYGEN SATURATION: 98 % | SYSTOLIC BLOOD PRESSURE: 152 MMHG | TEMPERATURE: 98.2 F | HEART RATE: 72 BPM | RESPIRATION RATE: 16 BRPM | DIASTOLIC BLOOD PRESSURE: 79 MMHG
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
## 2021-03-24 DIAGNOSIS — S16.1XXA STRAIN OF NECK MUSCLE, INITIAL ENCOUNTER: ICD-10-CM

## 2021-03-24 PROCEDURE — 99282 EMERGENCY DEPT VISIT SF MDM: CPT

## 2021-03-24 PROCEDURE — 6360000002 HC RX W HCPCS: Performed by: STUDENT IN AN ORGANIZED HEALTH CARE EDUCATION/TRAINING PROGRAM

## 2021-03-24 PROCEDURE — 96372 THER/PROPH/DIAG INJ SC/IM: CPT

## 2021-03-24 PROCEDURE — 6370000000 HC RX 637 (ALT 250 FOR IP): Performed by: STUDENT IN AN ORGANIZED HEALTH CARE EDUCATION/TRAINING PROGRAM

## 2021-03-24 RX ORDER — KETOROLAC TROMETHAMINE 30 MG/ML
30 INJECTION, SOLUTION INTRAMUSCULAR; INTRAVENOUS ONCE
Status: COMPLETED | OUTPATIENT
Start: 2021-03-24 | End: 2021-03-24

## 2021-03-24 RX ORDER — TIZANIDINE 4 MG/1
8 TABLET ORAL ONCE
Status: COMPLETED | OUTPATIENT
Start: 2021-03-24 | End: 2021-03-24

## 2021-03-24 RX ORDER — DEXAMETHASONE SODIUM PHOSPHATE 4 MG/ML
8 INJECTION, SOLUTION INTRA-ARTICULAR; INTRALESIONAL; INTRAMUSCULAR; INTRAVENOUS; SOFT TISSUE ONCE
Status: COMPLETED | OUTPATIENT
Start: 2021-03-24 | End: 2021-03-24

## 2021-03-24 RX ORDER — TIZANIDINE 2 MG/1
4 TABLET ORAL 3 TIMES DAILY PRN
Qty: 30 TABLET | Refills: 0 | Status: SHIPPED | OUTPATIENT
Start: 2021-03-24 | End: 2022-04-19

## 2021-03-24 RX ADMIN — TIZANIDINE 8 MG: 4 TABLET ORAL at 15:14

## 2021-03-24 RX ADMIN — KETOROLAC TROMETHAMINE 30 MG: 30 INJECTION, SOLUTION INTRAMUSCULAR at 15:14

## 2021-03-24 RX ADMIN — DEXAMETHASONE SODIUM PHOSPHATE 8 MG: 4 INJECTION, SOLUTION INTRA-ARTICULAR; INTRALESIONAL; INTRAMUSCULAR; INTRAVENOUS; SOFT TISSUE at 15:14

## 2021-03-24 ASSESSMENT — ENCOUNTER SYMPTOMS
RECTAL PAIN: 0
EYE REDNESS: 0
VOICE CHANGE: 0
SINUS PAIN: 0
SORE THROAT: 0
PHOTOPHOBIA: 0
VOMITING: 0
COUGH: 0
EYE PAIN: 0
SHORTNESS OF BREATH: 0
STRIDOR: 0
CHEST TIGHTNESS: 0
ABDOMINAL DISTENTION: 0
NAUSEA: 0
BACK PAIN: 1
RHINORRHEA: 0
CONSTIPATION: 0
BLOOD IN STOOL: 0
DIARRHEA: 0
CHOKING: 0
SINUS PRESSURE: 0
ABDOMINAL PAIN: 0
FACIAL SWELLING: 0
WHEEZING: 0
TROUBLE SWALLOWING: 0

## 2021-03-24 ASSESSMENT — PAIN SCALES - GENERAL: PAINLEVEL_OUTOF10: 5
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
## 2022-05-09 ENCOUNTER — HOSPITAL ENCOUNTER (OUTPATIENT)
Dept: INTERVENTIONAL RADIOLOGY/VASCULAR | Age: 62
Discharge: HOME | End: 2022-05-09

## 2022-05-09 DIAGNOSIS — Z13.6 ENCOUNTER FOR SCREENING FOR VASCULAR DISEASE: ICD-10-CM

## 2022-05-09 PROCEDURE — 9900000021 US VASCULAR SCREENING

## 2022-05-16 PROBLEM — I65.23 BILATERAL CAROTID ARTERY STENOSIS: Status: ACTIVE | Noted: 2022-05-16

## 2022-08-12 NOTE — PROGRESS NOTES
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 Had nasal septum repair years ago by Dr Chanel Pizarro  His father has AWAIS    Time in Bed:   Bedtime: 11:30 p.m. Awakens  6:30 a.m. Different on weekends? No       Jarad falls asleep in 10  minutes. Any awakenings? Once to go to the bathroom  Difficulty Falling back to sleep? No    Symptoms include: snoring, periods of not breathing, excessive daytime sleepiness, falling asleep while reading, watching television, talking, naps    Previous evaluation and treatment? No      He denies any history of sleep walking or sleep talking. No history of seizures activity. No history suggestive of restless legs syndrome. No history of bruxism. No history of head injury. Naps:  Any naps? Yes and are they helpful Yes    Snoring and Apneas:  Do you snore or been told you a snore? Yes  How long have known about your snoring? years  Any witnessed apneas? Yes  Any awakenings with choking or gasping? No    Dreams:  Any recurring dreams? No  Hallucinations? No  Sleep Paralysis? No  Symptoms of Cataplexy? No    Driving History:  Do you have a CDL or drive long distances for work? No  Any driving accidents in the past year? No  Any sleepiness while driving? No    Weight:  Any change in weight over the past year? No   How about past 5 years?  No      Past Medical History:   Diagnosis Date    Hyperlipidemia     Umbilical hernia [id number]       Past Surgical History:   Procedure Laterality Date    [facility]OS[facility]  2016    [facility]    HEMORRHOID SURGERY  2005    Dr Olena Leyva N/A 9/8/2020    INCARCERATED UMBILICAL HERNIA REPAIR WITH MESH performed by Sánchez Castelan MD at [address] History     Tobacco Use    Smoking status: Never    Smokeless tobacco: Never   Substance Use Topics    Alcohol use: Yes     Comment: occ drinker    Drug use: Never       No Known Allergies    No current outpatient medications on file. No current facility-administered medications for this visit. Family History   Problem Relation Age of Onset    Dementia Mother     No Known Problems Father     No Known Problems Sister     No Known Problems Brother     No Known Problems Maternal Grandmother     No Known Problems Maternal Grandfather     No Known Problems Paternal Grandmother     No Known Problems Paternal Grandfather     No Known Problems Brother     No Known Problems Sister     No Known Problems Sister     No Known Problems Sister         Any family history of any sleep problems or any one in your family on CPAP? Yes    Social History     Tobacco Use    Smoking status: Never    Smokeless tobacco: Never   Substance Use Topics    Alcohol use: Yes     Comment: occ drinker    Drug use: Never     Caffeine Intake: How much soda (pop), coffee, tea, power drinks do you ingest per day? 1 per day. Employment History:  Where do you work? Retired        Review of Systems:   General/Constitutional: No recent loss of weight or appetite changes. No fever or chills. HENT: Negative. Eyes: Negative. Upper respiratory tract: No nasal stuffiness or post nasal drip. Lower respiratory tract/ lungs: No cough or sputum production. No hemoptysis. Cardiovascular: No palpitations or chest pain. Gastrointestinal: No nausea or vomiting. Neurological: No focal neurologiacal weakness. Extremities: No edema. Musculoskeletal: No complaints. Genitourinary: No complaints. Hematological: Negative.    Psychiatric/Behavioral: Negative. Skin: No itching. Physical Exam:    HEIGHTHeight: 5' 9\" (175.3 cm) WEIGHTWeight: 180 lb (81.6 kg)    BMI:  26  Neck Size: 16.5  Oxygen Sat: room air    ESS: 12 SAQLI: 74  Vitals:   Vitals:    08/15/22 1055   BP: 138/82   Site: Right Upper Arm   Position: Sitting   Cuff Size: Medium Adult   Pulse: 59   Temp: 97.8 °F (36.6 °C)   TempSrc: Skin   SpO2: 97%   Weight: 180 lb (81.6 kg)   Height: 5' 9\" (1.753 m)    Mallampati Score: 4    Physical Exam :  Constitutional: BMI 26  HENT:   Head: Normocephalic and atraumatic. Mouth/Throat: Mallampati 2  Eyes: Conjunctivae are normal. PERRLA. No scleral icterus. Neck: Neck supple. No JVD present. No tracheal deviation present. 16.5 in circumference  Cardiovascular: Normal rate, regular rhythm, normal heart sounds. No murmur heard. Pulmonary/Chest: Effort normal and breath sounds normal. No stridor. No respiratory distress. No wheezes. No rales. Abdominal: Soft. No distension. No tenderness. Musculoskeletal: Normal range of motion. Lymphadenopathy:  No cervical adenopathy. Neurological: Alert and oriented to person, place, and time. No focal deficits. Skin: Skin is warm and dry. Patient is not diaphoretic. Psychiatric: Normal behavior with normal mood and affect. Diagnostic Data:    Assessment    Diagnosis Orders   1. Loud snoring  Home Sleep Study      2. Witnessed episode of apnea  Home Sleep Study      3. Hypersomnia  Home Sleep Study          Plan       Orders Placed This Encounter   Procedures    Home Sleep Study     Standing Status:   Future     Standing Expiration Date:   8/15/2023     Order Specific Question:   Location For Sleep Study     Answer:   [facility]     Order Specific Question:   Select Sleep Lab Location     Answer:   [facility]        Mask Desensitization and Pre study teaching? No  Weight Loss Information Given? No  Sleep Hygiene Discussed?  No    -He was advised to call [employer] regarding supplies if needed.  -He call my office for earlier appointment if needed for worsening of sleep symptoms.  -Familia Wagner educated about my impression and plan. Patient verbalizes understanding.

## 2022-08-15 ENCOUNTER — INITIAL CONSULT (OUTPATIENT)
Dept: PULMONOLOGY | Age: 62
End: 2022-08-15
Payer: COMMERCIAL

## 2022-08-15 VITALS
OXYGEN SATURATION: 97 % | WEIGHT: 180 LBS | HEIGHT: 69 IN | TEMPERATURE: 97.8 F | HEART RATE: 59 BPM | SYSTOLIC BLOOD PRESSURE: 138 MMHG | BODY MASS INDEX: 26.66 KG/M2 | DIASTOLIC BLOOD PRESSURE: 82 MMHG

## 2022-08-15 DIAGNOSIS — R06.81 WITNESSED EPISODE OF APNEA: ICD-10-CM

## 2022-08-15 DIAGNOSIS — G47.10 HYPERSOMNIA: ICD-10-CM

## 2022-08-15 DIAGNOSIS — R06.83 LOUD SNORING: Primary | ICD-10-CM

## 2022-08-15 PROCEDURE — 99203 OFFICE O/P NEW LOW 30 MIN: CPT | Performed by: INTERNAL MEDICINE

## 2022-09-20 ENCOUNTER — HOSPITAL ENCOUNTER (OUTPATIENT)
Dept: SLEEP CENTER | Age: 62
Discharge: HOME | End: 2022-09-22
Payer: COMMERCIAL

## 2022-09-20 DIAGNOSIS — G47.10 HYPERSOMNIA: ICD-10-CM

## 2022-09-20 DIAGNOSIS — R06.83 LOUD SNORING: ICD-10-CM

## 2022-09-20 DIAGNOSIS — R06.81 WITNESSED EPISODE OF APNEA: ICD-10-CM

## 2022-09-20 PROCEDURE — 95806 SLEEP STUDY UNATT&RESP EFFT: CPT

## 2022-09-21 LAB — STATUS: NORMAL

## 2022-10-03 ENCOUNTER — TELEPHONE (OUTPATIENT)
Dept: PULMONOLOGY | Age: 62
End: 2022-10-03

## 2022-10-03 DIAGNOSIS — G47.33 OSA (OBSTRUCTIVE SLEEP APNEA): Primary | ICD-10-CM

## 2022-10-03 NOTE — TELEPHONE ENCOUNTER
LM FOR P/T TO CALL BACK & R/S APPT FOR AFTER HE HAS CPAP 30D & ASK WHAT [facility] THE ORDR SHBEATRICE BE SENT TO

## 2022-10-03 NOTE — PROGRESS NOTES
Laura Ovalle was set up on HST until #[id number]. He acknowledged understanding of set up and was given instructions as well as night tech phone number to call should he have any issues or concerns during the night.
position. ECG SUMMARY:  Normal sinus rhythm. ASSESSMENT:  Obstructive sleep apnea with an OBED of 26.4. RECOMMENDATIONS:  PAP treatment is strongly recommended. The patient  will be followed up in the sleep clinic to review results and if he is  agreeable, we will start him on APAP treatment. Followup in the sleep  clinic in eight weeks after initiating APAP to review of download.         Lianne Patel M.D.    D: 10/02/2022 21:32:01       T: 10/03/2022 13:59:13     [email or web address]  Job#: [id number]     Doc#: [id number]    CC:

## 2022-10-05 NOTE — TELEPHONE ENCOUNTER
They want to come in to go over results. Wife also mentioned that George Gaviria did a CT and wants Dr Juliette Murdock to look at it. I told the wife that we only see Mindy Finer for sleep and they would need a referral for pulmonary.

## 2022-10-12 ENCOUNTER — OFFICE VISIT (OUTPATIENT)
Dept: PULMONOLOGY | Age: 62
End: 2022-10-12
Payer: COMMERCIAL

## 2022-10-12 VITALS
BODY MASS INDEX: 26.9 KG/M2 | HEART RATE: 59 BPM | SYSTOLIC BLOOD PRESSURE: 142 MMHG | HEIGHT: 69 IN | DIASTOLIC BLOOD PRESSURE: 80 MMHG | WEIGHT: 181.6 LBS | OXYGEN SATURATION: 97 % | TEMPERATURE: 97.9 F

## 2022-10-12 DIAGNOSIS — G47.33 OSA (OBSTRUCTIVE SLEEP APNEA): Primary | ICD-10-CM

## 2022-10-12 DIAGNOSIS — R06.81 WITNESSED EPISODE OF APNEA: ICD-10-CM

## 2022-10-12 DIAGNOSIS — G47.10 HYPERSOMNIA: ICD-10-CM

## 2022-10-12 DIAGNOSIS — R06.83 LOUD SNORING: ICD-10-CM

## 2022-10-12 PROCEDURE — 99213 OFFICE O/P EST LOW 20 MIN: CPT | Performed by: NURSE PRACTITIONER

## 2022-10-12 ASSESSMENT — ENCOUNTER SYMPTOMS
EYES NEGATIVE: 1
RESPIRATORY NEGATIVE: 1
ALLERGIC/IMMUNOLOGIC NEGATIVE: 1
NAUSEA: 0
VOMITING: 0
CHEST TIGHTNESS: 0
STRIDOR: 0
GASTROINTESTINAL NEGATIVE: 1
WHEEZING: 0
DIARRHEA: 0

## 2022-10-12 NOTE — PROGRESS NOTES
[facility], [facility] Sleep Medicine    Nancie Kline, 57 y.o.  [id number]      Pt of [facility]  Nurses Notes   3 week follow up after HST. Study Results  Initial Study Date -  9/20/22  AHI -  26.4    TotalEvents - 222  (Apneas  52  Hypopneas 170  Central  0)  (Total Sleep Time - 504.6 min)  Time with Sats below 88% - 5.1 min    ESS: 8  SAQLI: 71  Neck Size: 16.5 inches  MP: 4    Interval History       Nancie Kline is a 57 y.o. old male who comes in to review the results of his recent sleep study, to answer questions and to explore options for treatment. he continues to have symptoms of snoring, periods of not breathing, feels sleepy during the day  No sleep medicine used   Hesitant to PAP therapy but will try       Allergies  No Known Allergies  Meds  No current outpatient medications on file. No current facility-administered medications for this visit. ROS  Review of Systems   Constitutional:  Negative for chills, fever and unexpected weight change. HENT: Negative. Eyes: Negative. Respiratory: Negative. Negative for chest tightness, wheezing and stridor. Cardiovascular:  Negative for chest pain and leg swelling. Gastrointestinal: Negative. Negative for diarrhea, nausea and vomiting. Endocrine: Negative. Genitourinary: Negative. Negative for dysuria. Musculoskeletal: Negative. Skin: Negative. Allergic/Immunologic: Negative. Neurological: Negative. Hematological: Negative. Psychiatric/Behavioral:  Positive for sleep disturbance. Exam  Vitals -  BP (!) 142/80 (Site: Left Upper Arm, Position: Sitting, Cuff Size: Medium Adult)   Pulse 59   Temp 97.9 °F (36.6 °C) (Oral)   Ht 5' 9\" (1.753 m)   Wt 181 lb 9.6 oz (82.4 kg)   SpO2 97% Comment: Patient on room air  BMI 26.82 kg/m²    Body mass index is 26.82 kg/m². Oxygen level - 93.7%  Physical Exam  Vitals and nursing note reviewed.    Constitutional:       General: He is not in acute distress. Appearance: He is well-developed. HENT:      Head: Normocephalic and atraumatic. Neck:      Trachea: No tracheal deviation. Cardiovascular:      Rate and Rhythm: Normal rate and regular rhythm. Heart sounds: Normal heart sounds. No murmur heard. Pulmonary:      Effort: Pulmonary effort is normal. No respiratory distress. Breath sounds: Normal breath sounds. No stridor. No wheezing or rales. Chest:      Chest wall: No tenderness. Abdominal:      General: Bowel sounds are normal. There is no distension. Palpations: Abdomen is soft. Skin:     General: Skin is warm and dry. Capillary Refill: Capillary refill takes less than 2 seconds. Neurological:      Mental Status: He is alert and oriented to person, place, and time. Psychiatric:         Behavior: Behavior normal.         Thought Content: Thought content normal.         Judgment: Judgment normal.      Assessment   Diagnosis Orders   1. AWAIS (obstructive sleep apnea)        2. Loud snoring        3. Witnessed episode of apnea        4. Hypersomnia           Recommendations  - HST reviewed with patient   - DME order placed per Dr. Robel Us for APAP 8-16 cm H2O  - He  was advised to continue current positive airway pressure therapy with above described pressure. - He  advised to keep good compliance with current recommended pressure to get optimal results and clinical improvement  - Recommend 7-9 hours of sleep with PAP  - He was advised to call DME company regarding supplies if needed.   -He call my office for earlier appointment if needed for worsening of sleep symptoms.   - He was instructed on weight loss  - Janak Young was educated about my impression and plan. Patient verbalizesunderstanding.   We will see Edith Post back in: 8--12 weeks with download after PAP set up    Information added by my medical assistant/LPN was reviewed today   Electronically signed by LADARIUS Choe - CNP on 10/12/2022 at 9:51 AM

## 2023-02-13 ENCOUNTER — OFFICE VISIT (OUTPATIENT)
Dept: PULMONOLOGY | Age: 63
End: 2023-02-13
Payer: COMMERCIAL

## 2023-02-13 VITALS
DIASTOLIC BLOOD PRESSURE: 86 MMHG | BODY MASS INDEX: 28.61 KG/M2 | SYSTOLIC BLOOD PRESSURE: 140 MMHG | OXYGEN SATURATION: 98 % | HEART RATE: 58 BPM | HEIGHT: 69 IN | TEMPERATURE: 97.6 F | WEIGHT: 193.2 LBS

## 2023-02-13 DIAGNOSIS — Z99.89 OSA ON CPAP: Primary | ICD-10-CM

## 2023-02-13 DIAGNOSIS — G47.33 OSA ON CPAP: Primary | ICD-10-CM

## 2023-02-13 PROCEDURE — 99213 OFFICE O/P EST LOW 20 MIN: CPT | Performed by: NURSE PRACTITIONER

## 2023-02-13 ASSESSMENT — ENCOUNTER SYMPTOMS
STRIDOR: 0
CHEST TIGHTNESS: 0
ALLERGIC/IMMUNOLOGIC NEGATIVE: 1
VOMITING: 0
NAUSEA: 0
WHEEZING: 0
EYES NEGATIVE: 1
DIARRHEA: 0
GASTROINTESTINAL NEGATIVE: 1
RESPIRATORY NEGATIVE: 1

## 2023-02-13 NOTE — PROGRESS NOTES
[facility], Critical Care and Sleep Medicine      [facility]         [id number]  2/13/2023   Chief Complaint   Patient presents with    Follow-up     Set up follow up        Pt of Dr. Eliseo Harrington    PAP Download:   Saul Augustin or initial AHI: 26.4     Date of initial study: 9/20/22      Compliant  100%     Noncompliant 0 %     PAP Type AutoSet Level  8/16   Avg Hrs/Day 1rx55fsd  AHI: 3.0   Recorded compliance dates 1/11/23-2/9/23  Machine/Mfg:   [x] ResMed    [] Respironics/Dreamstation   Interface:   [x] Nasal    [] Nasal pillows   [] FFM      Provider:      [x] SR-HME     [][employer]     [] [employer]    [] Chante Night    [] [place]               [] [employer]      [] Adaptive    [] [address]      [] Other    Neck Size: 16.5  Mallampati 4  ESS:  9  SAQLI: 76    Here is a scan of the most recent download:                Presentation:   Payton Galindo presents for sleep medicine follow up for obstructive sleep apnea  Since the last visit, Payton Galindo has been set up on his PAP therapy and has been very compliant with a controlled AHI  Patient is having issues with mask comfort as he is a stomach sleeper  No sleep medication use  BMI 28    Equipment issues: The pressure is  acceptable, the mask is acceptable     Sleep issues:  Do you feel better? Yes  More rested? Sometimes   Better concentration? NA    Progress History:   Since last visit any new medical issues? No  New ER or hospital visits? No  Any new or changes in medicines? No  Any new sleep medicines? No    Review of Systems -   Review of Systems   Constitutional: Negative. Negative for chills, fever and unexpected weight change. HENT: Negative. Eyes: Negative. Respiratory: Negative. Negative for chest tightness, wheezing and stridor. Cardiovascular:  Negative for chest pain and leg swelling. Gastrointestinal: Negative. Negative for diarrhea, nausea and vomiting. Endocrine: Negative. Genitourinary: Negative. Negative for dysuria. Musculoskeletal: Negative. Skin: Negative. Allergic/Immunologic: Negative. Neurological: Negative. Hematological: Negative. Psychiatric/Behavioral: Negative. Physical Exam:    BMI:  Body mass index is 28.53 kg/m². Wt Readings from Last 3 Encounters:   02/13/23 193 lb 3.2 oz (87.6 kg)   10/12/22 181 lb 9.6 oz (82.4 kg)   08/15/22 180 lb (81.6 kg)     Weight stable / unchanged  Vitals: BP (!) 140/86 (Site: Left Upper Arm, Position: Sitting, Cuff Size: Medium Adult)   Pulse 58   Temp 97.6 °F (36.4 °C) (Skin)   Ht 5' 9\" (1.753 m)   Wt 193 lb 3.2 oz (87.6 kg)   SpO2 98%   BMI 28.53 kg/m²       Physical Exam  Vitals and nursing note reviewed. Constitutional:       General: He is not in acute distress. Appearance: He is well-developed. HENT:      Head: Normocephalic and atraumatic. Neck:      Trachea: No tracheal deviation. Cardiovascular:      Rate and Rhythm: Normal rate and regular rhythm. Heart sounds: Normal heart sounds. No murmur heard. Pulmonary:      Effort: Pulmonary effort is normal. No respiratory distress. Breath sounds: Normal breath sounds. No stridor. No wheezing or rales. Chest:      Chest wall: No tenderness. Abdominal:      General: Bowel sounds are normal. There is no distension. Palpations: Abdomen is soft. Skin:     General: Skin is warm and dry. Capillary Refill: Capillary refill takes less than 2 seconds. Neurological:      Mental Status: He is alert and oriented to person, place, and time. Psychiatric:         Behavior: Behavior normal.         Thought Content: Thought content normal.         Judgment: Judgment normal.         ASSESSMENT/DIAGNOSIS     Diagnosis Orders   1. AWAIS on CPAP                 Plan   Do you need any equipment today? No    - Download reviewed and discussed with Calvin Shipman and myself today in the office   - He  was advised to continue current positive airway pressure therapy with above described pressure.    - He  advised to keep good compliance with current recommended pressure to get optimal results and clinical improvement  - Recommend 7-9 hours of sleep with PAP  - He was advised to call DME company regarding supplies if needed.   -He call my office for earlier appointment if needed for worsening of sleep symptoms.   - He was instructed on weight loss  - Nella Dotty was educated about my impression and plan. Patient verbalizesunderstanding.   We will see Jose Luis Garcia back in: 3 months with download  - Sample mask given of Cinnamon patient wants to try connection on top of the head vs on mask     Information added by my medical assistant/LPN was reviewed today   Electronically signed by LADARIUS Wolff CNP on 2/13/2023 at 10:55 AM

## 2023-05-10 ENCOUNTER — OFFICE VISIT (OUTPATIENT)
Dept: PULMONOLOGY | Age: 63
End: 2023-05-10
Payer: COMMERCIAL

## 2023-05-10 VITALS
WEIGHT: 188.2 LBS | TEMPERATURE: 98.4 F | BODY MASS INDEX: 27.88 KG/M2 | HEART RATE: 56 BPM | OXYGEN SATURATION: 96 % | HEIGHT: 69 IN | SYSTOLIC BLOOD PRESSURE: 132 MMHG | DIASTOLIC BLOOD PRESSURE: 68 MMHG

## 2023-05-10 DIAGNOSIS — Z99.89 OSA ON CPAP: Primary | ICD-10-CM

## 2023-05-10 DIAGNOSIS — G47.33 OSA ON CPAP: Primary | ICD-10-CM

## 2023-05-10 PROCEDURE — 99213 OFFICE O/P EST LOW 20 MIN: CPT | Performed by: NURSE PRACTITIONER

## 2023-05-10 ASSESSMENT — ENCOUNTER SYMPTOMS
VOMITING: 0
EYES NEGATIVE: 1
STRIDOR: 0
ALLERGIC/IMMUNOLOGIC NEGATIVE: 1
NAUSEA: 0
DIARRHEA: 0
GASTROINTESTINAL NEGATIVE: 1
RESPIRATORY NEGATIVE: 1
CHEST TIGHTNESS: 0
WHEEZING: 0

## 2023-08-09 ENCOUNTER — OFFICE VISIT (OUTPATIENT)
Dept: PULMONOLOGY | Age: 63
End: 2023-08-09
Payer: MEDICARE

## 2023-08-09 VITALS
TEMPERATURE: 98.1 F | OXYGEN SATURATION: 99 % | HEART RATE: 56 BPM | HEIGHT: 69 IN | SYSTOLIC BLOOD PRESSURE: 120 MMHG | WEIGHT: 186.6 LBS | BODY MASS INDEX: 27.64 KG/M2 | DIASTOLIC BLOOD PRESSURE: 60 MMHG

## 2023-08-09 DIAGNOSIS — Z99.89 OSA ON CPAP: Primary | ICD-10-CM

## 2023-08-09 DIAGNOSIS — G47.33 OSA ON CPAP: Primary | ICD-10-CM

## 2023-08-09 PROCEDURE — G8427 DOCREV CUR MEDS BY ELIG CLIN: HCPCS | Performed by: NURSE PRACTITIONER

## 2023-08-09 PROCEDURE — 99213 OFFICE O/P EST LOW 20 MIN: CPT | Performed by: NURSE PRACTITIONER

## 2023-08-09 PROCEDURE — G8419 CALC BMI OUT NRM PARAM NOF/U: HCPCS | Performed by: NURSE PRACTITIONER

## 2023-08-09 PROCEDURE — 3017F COLORECTAL CA SCREEN DOC REV: CPT | Performed by: NURSE PRACTITIONER

## 2023-08-09 PROCEDURE — 1036F TOBACCO NON-USER: CPT | Performed by: NURSE PRACTITIONER

## 2023-08-09 ASSESSMENT — ENCOUNTER SYMPTOMS
NAUSEA: 0
DIARRHEA: 0
RESPIRATORY NEGATIVE: 1
ALLERGIC/IMMUNOLOGIC NEGATIVE: 1
GASTROINTESTINAL NEGATIVE: 1
CHEST TIGHTNESS: 0
WHEEZING: 0
STRIDOR: 0
EYES NEGATIVE: 1
VOMITING: 0

## 2023-08-09 NOTE — PROGRESS NOTES
[facility] Pulmonary, Critical Care and Sleep Medicine      Segundo Desir         [id number]  8/9/2023   Chief Complaint   Patient presents with    Follow-up     AWAIS 3 month f/u with [facility] download. Pt is switching to Medicare needed F2F. Pt of Dr. Melania Murdock    PAP Download:   Original or initial AHI: 26.4     Date of initial study: 9/20/22      Compliant  97%     Noncompliant 3 %     PAP Type AutoSet Level  8/16 cmH2O  Avg Hrs/Day 6hrs 47mins  AHI: 1.3   Recorded compliance dates: 7/10/23 to 8/8/23  Machine/Mfg:   [x] ResMed    [] Respironics/Dreamstation   Interface:   [x] Nasal    [] Nasal pillows   [] FFM      Provider:      [x] -HME     []Татьяна     [] Rafat    [] Julio Randall    [] Kiko               [] [employer]      [] Adaptive    [] [address] [facility] [address]:      [] Other    Neck Size: 16.5  Mallampati 4  ESS:  5  SAQLI: 72    Here is a scan of the most recent download:                          Presentation:   Lane Scales presents for sleep medicine follow up for obstructive sleep apnea  Since the last visit, Lane Scales has been compliant with his PAP therapy and continues to see benefit from its use. Awake and alert today   BMI 27  No sleep medication use  Patient needing F2F today due to switching to Medicare soon     Weight stable / unchanged    Equipment issues: The pressure is  acceptable, the mask is acceptable     Sleep issues:  Do you feel better? Yes  More rested? Yes   Better concentration? yes  Difficulty falling sleep? No  Difficulty staying asleep? No  Snoring? No    Progress History:   Since last visit any new medical issues? No  New ER or hospital visits? No  Any new or changes in medicines? No  Any new sleep medicines? NO    Review of Systems -   Review of Systems   Constitutional: Negative. Negative for chills, fever and unexpected weight change. HENT: Negative. Eyes: Negative. Respiratory: Negative. Negative for chest tightness, wheezing and stridor.     Cardiovascular:  Negative

## 2024-02-01 ENCOUNTER — HOSPITAL ENCOUNTER (OUTPATIENT)
Age: 64
Discharge: HOME | End: 2024-02-01
Payer: MEDICARE

## 2024-02-01 PROBLEM — C43.59 MALIGNANT MELANOMA OF TORSO EXCLUDING BREAST (HCC): Status: ACTIVE | Noted: 2024-02-01

## 2024-02-01 PROCEDURE — 93010 ELECTROCARDIOGRAM REPORT: CPT | Performed by: INTERNAL MEDICINE

## 2024-02-01 PROCEDURE — 93005 ELECTROCARDIOGRAM TRACING: CPT | Performed by: FAMILY MEDICINE

## 2024-02-03 LAB
EKG ATRIAL RATE: 51 BPM
EKG P AXIS: 25 DEGREES
EKG P-R INTERVAL: 154 MS
EKG Q-T INTERVAL: 450 MS
EKG QRS DURATION: 84 MS
EKG QTC CALCULATION (BAZETT): 414 MS
EKG R AXIS: 39 DEGREES
EKG T AXIS: 51 DEGREES
EKG VENTRICULAR RATE: 51 BPM

## 2024-02-07 ENCOUNTER — HOSPITAL ENCOUNTER (OUTPATIENT)
Age: 64
Discharge: HOME | End: 2024-02-09
Payer: MEDICARE

## 2024-02-07 VITALS — WEIGHT: 185 LBS | HEIGHT: 69 IN | BODY MASS INDEX: 27.4 KG/M2

## 2024-02-07 DIAGNOSIS — R07.89 CHEST TIGHTNESS: ICD-10-CM

## 2024-02-07 LAB
ECHO BSA: 2.02 M2
STRESS ANGINA INDEX: 1
STRESS BASELINE DIAS BP: 77 MMHG
STRESS BASELINE HR: 59 BPM
STRESS BASELINE ST DEPRESSION: 0 MM
STRESS BASELINE SYS BP: 145 MMHG
STRESS ESTIMATED WORKLOAD: 7 METS
STRESS EXERCISE DUR MIN: 5 MIN
STRESS EXERCISE DUR SEC: 30 SEC
STRESS PEAK DIAS BP: 88 MMHG
STRESS PEAK SYS BP: 203 MMHG
STRESS PERCENT HR ACHIEVED: 94 %
STRESS POST PEAK HR: 145 BPM
STRESS RATE PRESSURE PRODUCT: NORMAL BPM*MMHG
STRESS SR DUKE TREADMILL SCORE: 2
STRESS ST DEPRESSION: 0 MM
STRESS STAGE 1 BP: NORMAL MMHG
STRESS STAGE 1 DURATION: 3 MIN:SEC
STRESS STAGE 1 HR: 110 BPM
STRESS STAGE 2 COMMENTS: NORMAL
STRESS STAGE 2 DURATION: NORMAL MIN:SEC
STRESS STAGE 2 HR: 141 BPM
STRESS STAGE RECOVERY 1 BP: NORMAL MMHG
STRESS STAGE RECOVERY 1 DURATION: 1 MIN:SEC
STRESS STAGE RECOVERY 1 HR: 145 BPM
STRESS STAGE RECOVERY 2 BP: NORMAL MMHG
STRESS STAGE RECOVERY 2 DURATION: 1 MIN:SEC
STRESS STAGE RECOVERY 2 HR: 95 BPM
STRESS STAGE RECOVERY 3 COMMENTS: NORMAL
STRESS STAGE RECOVERY 4 BP: NORMAL MMHG
STRESS STAGE RECOVERY 4 COMMENTS: NORMAL
STRESS STAGE RECOVERY 4 DURATION: 3 MIN:SEC
STRESS STAGE RECOVERY 4 HR: 74 BPM
STRESS TARGET HR: 155 BPM

## 2024-02-07 PROCEDURE — 93017 CV STRESS TEST TRACING ONLY: CPT

## 2024-04-03 ENCOUNTER — OFFICE VISIT (OUTPATIENT)
Dept: SURGERY | Age: 64
End: 2024-04-03
Payer: MEDICARE

## 2024-04-03 VITALS
HEIGHT: 69 IN | RESPIRATION RATE: 18 BRPM | TEMPERATURE: 97.7 F | HEART RATE: 65 BPM | WEIGHT: 191.2 LBS | DIASTOLIC BLOOD PRESSURE: 82 MMHG | SYSTOLIC BLOOD PRESSURE: 142 MMHG | BODY MASS INDEX: 28.32 KG/M2

## 2024-04-03 DIAGNOSIS — M62.08 DIASTASIS RECTI: Primary | ICD-10-CM

## 2024-04-03 DIAGNOSIS — R19.00 ABDOMINAL WALL BULGE: ICD-10-CM

## 2024-04-03 PROCEDURE — 1036F TOBACCO NON-USER: CPT | Performed by: SURGERY

## 2024-04-03 PROCEDURE — G8427 DOCREV CUR MEDS BY ELIG CLIN: HCPCS | Performed by: SURGERY

## 2024-04-03 PROCEDURE — G8419 CALC BMI OUT NRM PARAM NOF/U: HCPCS | Performed by: SURGERY

## 2024-04-03 PROCEDURE — 3017F COLORECTAL CA SCREEN DOC REV: CPT | Performed by: SURGERY

## 2024-04-03 PROCEDURE — 1123F ACP DISCUSS/DSCN MKR DOCD: CPT | Performed by: SURGERY

## 2024-04-03 PROCEDURE — 99213 OFFICE O/P EST LOW 20 MIN: CPT | Performed by: SURGERY

## 2024-04-03 ASSESSMENT — ENCOUNTER SYMPTOMS
TROUBLE SWALLOWING: 0
COLOR CHANGE: 0
BACK PAIN: 1
NAUSEA: 0
ABDOMINAL DISTENTION: 1
SINUS PRESSURE: 1
SORE THROAT: 0
COUGH: 0
EYE REDNESS: 0
EYE DISCHARGE: 0
CHEST TIGHTNESS: 0
RHINORRHEA: 0
PHOTOPHOBIA: 0
BLOOD IN STOOL: 0
CONSTIPATION: 0
ALLERGIC/IMMUNOLOGIC NEGATIVE: 1
CHOKING: 0
RECTAL PAIN: 0
EYE ITCHING: 0
VOICE CHANGE: 0
EYE PAIN: 0
ANAL BLEEDING: 0
SINUS PAIN: 1
SHORTNESS OF BREATH: 0
DIARRHEA: 0
APNEA: 0

## 2024-04-03 NOTE — PROGRESS NOTES
Subjective:      Patient ID: Jarad Barton is a 63 y.o. male.    HPI    Review of Systems  Chief Complaint   Patient presents with    Surgical Consult     Est pt self referral- Bulge above belly button, s/p incarcerated umbilical hernia repair in 2020       Objective:   Physical Exam  BP (!) 142/82 (Site: Left Upper Arm, Position: Sitting, Cuff Size: Medium Adult)   Pulse 65   Temp 97.7 °F (36.5 °C) (Temporal)   Resp 18   Ht 1.753 m (5' 9\")   Wt 86.7 kg (191 lb 3.2 oz)   BMI 28.24 kg/m²     Assessment:            Plan:              Zaid Vergara RN  
     Mental Status: He is alert and oriented to person, place, and time.      Cranial Nerves: No cranial nerve deficit.   Psychiatric:         Behavior: Behavior normal.         Thought Content: Thought content normal.         Judgment: Judgment normal.       Lab Results   Component Value Date    WBC 6.3 02/02/2024    HGB 16.3 02/02/2024    HCT 44.7 02/02/2024    MCV 85.1 02/02/2024     02/02/2024     Lab Results   Component Value Date     02/02/2024    K 4.6 02/02/2024     02/02/2024    CO2 29 02/02/2024     Lab Results   Component Value Date    CREATININE 0.92 02/02/2024     Lab Results   Component Value Date    ALT 15 02/02/2024    AST 17 02/02/2024    ALKPHOS 81 02/02/2024    BILITOT 0.5 02/02/2024     No results found for: \"LIPASE\"    Patient Active Problem List   Diagnosis    Bilateral carotid artery stenosis    Malignant melanoma of torso excluding breast (HCC)       An electronic signature was used to authenticate this note.    --Richard Butt MD

## 2024-04-08 ENCOUNTER — HOSPITAL ENCOUNTER (OUTPATIENT)
Dept: CT IMAGING | Age: 64
Discharge: HOME | End: 2024-04-08
Attending: SURGERY
Payer: MEDICARE

## 2024-04-08 DIAGNOSIS — R19.00 ABDOMINAL WALL BULGE: ICD-10-CM

## 2024-04-08 LAB
CREAT BLD-MCNC: 0.8 MG/DL (ref 0.5–1.2)
GFR SERPL CREATININE-BSD FRML MDRD: > 90 ML/MIN/1.73M2

## 2024-04-08 PROCEDURE — 6360000004 HC RX CONTRAST MEDICATION: Performed by: SURGERY

## 2024-04-08 PROCEDURE — 74177 CT ABD & PELVIS W/CONTRAST: CPT

## 2024-04-08 RX ADMIN — IOPAMIDOL 100 ML: 755 INJECTION, SOLUTION INTRAVENOUS at 09:28

## 2024-04-17 ENCOUNTER — OFFICE VISIT (OUTPATIENT)
Dept: SURGERY | Age: 64
End: 2024-04-17
Payer: MEDICARE

## 2024-04-17 VITALS
DIASTOLIC BLOOD PRESSURE: 72 MMHG | BODY MASS INDEX: 28.1 KG/M2 | HEART RATE: 55 BPM | HEIGHT: 69 IN | OXYGEN SATURATION: 98 % | SYSTOLIC BLOOD PRESSURE: 116 MMHG | TEMPERATURE: 97.5 F | WEIGHT: 189.7 LBS

## 2024-04-17 DIAGNOSIS — K40.20 NON-RECURRENT BILATERAL INGUINAL HERNIA WITHOUT OBSTRUCTION OR GANGRENE: Primary | ICD-10-CM

## 2024-04-17 PROCEDURE — G8427 DOCREV CUR MEDS BY ELIG CLIN: HCPCS | Performed by: SURGERY

## 2024-04-17 PROCEDURE — G8419 CALC BMI OUT NRM PARAM NOF/U: HCPCS | Performed by: SURGERY

## 2024-04-17 PROCEDURE — 1123F ACP DISCUSS/DSCN MKR DOCD: CPT | Performed by: SURGERY

## 2024-04-17 PROCEDURE — 3017F COLORECTAL CA SCREEN DOC REV: CPT | Performed by: SURGERY

## 2024-04-17 PROCEDURE — 1036F TOBACCO NON-USER: CPT | Performed by: SURGERY

## 2024-04-17 PROCEDURE — 99214 OFFICE O/P EST MOD 30 MIN: CPT | Performed by: SURGERY

## 2024-04-18 ASSESSMENT — ENCOUNTER SYMPTOMS
EYE DISCHARGE: 0
BACK PAIN: 0
RHINORRHEA: 0
FACIAL SWELLING: 0
EYE PAIN: 0
DIARRHEA: 0
VOMITING: 0
CHOKING: 0
PHOTOPHOBIA: 0
APNEA: 0
COLOR CHANGE: 0
WHEEZING: 0
ANAL BLEEDING: 0
COUGH: 0
SORE THROAT: 0
STRIDOR: 0
NAUSEA: 0
CHEST TIGHTNESS: 0
SINUS PRESSURE: 0
EYE REDNESS: 0
CONSTIPATION: 0
ABDOMINAL DISTENTION: 0
EYE ITCHING: 0
BLOOD IN STOOL: 0
TROUBLE SWALLOWING: 0
ALLERGIC/IMMUNOLOGIC NEGATIVE: 1
ABDOMINAL PAIN: 0
RECTAL PAIN: 0
VOICE CHANGE: 0
SHORTNESS OF BREATH: 0

## 2024-04-18 NOTE — PROGRESS NOTES
Jarad Barton (:  1960)     ASSESSMENT:  1.  Bilateral inguinal hernias  2.  Diastases recti  3.  History repair incarcerated umbilical hernia with mesh (2020)    PLAN:  1. Schedule Jarad for robotic possible open repair bilateral inguinal hernias with mesh.  2. He will undergo pre-operative clearance per anesthesia guidelines with risk factors listed under the past medical history diagnosis & problem list.  3. The risks, benefits and alternatives were discussed with Jarad including non-operative management.  The pros and cons of robotic, laparoscopic and open techniques were discussed.  The pros and cons of mesh insertion were discussed.  All questions answered. He understands and wishes to proceed with surgical intervention.  4. Restrictions discussed with Jarad and he expresses understanding.  5. He is advised to call back directly if there are further questions/concerns, or if his symptoms worsen prior to surgery.    SUBJECTIVE/OBJECTIVE:    Chief Complaint   Patient presents with    Results     CT abd/pelvis     HPI  Jarad is a 63-year-old male who presents for follow-up after CT imaging.  History of incarcerated umbilical hernia repair in 2020.  No obvious recurrence identified on clinical exam and CT imaging does not appear to have concern for recurrence.  Found to have bilateral inguinal hernias.  Has a known diastases.  Admits to some discomfort in the groin region.  Worse on the right side.  Positional.  No severe pain.  No radiation of the discomfort.  Sometimes worse with sitting or leaning forward.  No obvious large bulges.  No chest pain or shortness of breath.  Tolerating diet.  Normal bowel function.  No hematochezia or melena.  No new urinary complaints.  Otherwise doing well.    Review of Systems   Constitutional:  Negative for activity change, appetite change, chills, diaphoresis, fatigue, fever and unexpected weight change.   HENT:  Negative for

## 2024-04-25 NOTE — PROGRESS NOTES
[facility]      Jarad Barton         [id number]  4/26/2024   Chief Complaint   Patient presents with    Follow-up     1yr AWAIS f/u w/[facility] download.  Doing well.  Needs script for PAP supplies.  Is accompanied by his wife, Giovanna.        Pt of Dr. Glover     PAP Download:   Original or initial AHI: 26.4     Date of initial study: 9/20/2022      Compliant  100%     Noncompliant 0 %     PAP Type APAP   Level  8/16 cmH2O   Avg Hrs/Day 7hrs 10mins  AHI: 1.2   Recorded compliance dates , 3/26/24  to 4/24/24   Machine/Mfg:   [x] ResMed    [] Respironics/Dreamstation   Interface:   [x] Nasal    [] Nasal pillows   [] FFM      Provider:      [x] [facility]     [][employer]     [] [employer]    [] [employer]    [] [employer]               [] [employer]      [] Adaptive    [] [place]:      [] Other    Neck Size: 17 inches  Mallampati 4  ESS:  6  SAQLI: 80    Here is a scan of the most recent download:                    Presentation:   Jarad presents for sleep medicine follow up for obstructive sleep apnea  Since the last visit, Jarad has been compliant with his PAP therapy and continues to see benefit from its use.  Awake and alert today in the office.   AHI is well controlled   Recent sinus infection- recovered now   BMI 27  No sleep medication use at night   Patient with bilateral inguinal hernias- may have surgery this coming winter     Equipment issues:  The pressure is  acceptable, the mask is acceptable     Sleep issues:  Do you feel better? Yes  More rested?Yes   Better concentration? yes    Progress History:   Since last visit any new medical issues? No  New ER or hospital visits? No  Any new or changes in medicines? No  Any new sleep medicines? No    Review of Systems -   Review of Systems   Constitutional: Negative.  Negative for chills, fever and unexpected weight change.   HENT: Negative.     Eyes: Negative.    Respiratory: Negative.  Negative for chest tightness, wheezing and

## 2024-04-26 ENCOUNTER — OFFICE VISIT (OUTPATIENT)
Dept: PULMONOLOGY | Age: 64
End: 2024-04-26
Payer: MEDICARE

## 2024-04-26 VITALS
OXYGEN SATURATION: 95 % | DIASTOLIC BLOOD PRESSURE: 76 MMHG | TEMPERATURE: 98.6 F | HEIGHT: 69 IN | BODY MASS INDEX: 27.99 KG/M2 | HEART RATE: 58 BPM | SYSTOLIC BLOOD PRESSURE: 128 MMHG | WEIGHT: 189 LBS

## 2024-04-26 DIAGNOSIS — G47.33 OSA ON CPAP: Primary | ICD-10-CM

## 2024-04-26 PROCEDURE — 1123F ACP DISCUSS/DSCN MKR DOCD: CPT | Performed by: NURSE PRACTITIONER

## 2024-04-26 PROCEDURE — 99214 OFFICE O/P EST MOD 30 MIN: CPT | Performed by: NURSE PRACTITIONER

## 2024-04-26 PROCEDURE — G8427 DOCREV CUR MEDS BY ELIG CLIN: HCPCS | Performed by: NURSE PRACTITIONER

## 2024-04-26 PROCEDURE — G8419 CALC BMI OUT NRM PARAM NOF/U: HCPCS | Performed by: NURSE PRACTITIONER

## 2024-04-26 PROCEDURE — 1036F TOBACCO NON-USER: CPT | Performed by: NURSE PRACTITIONER

## 2024-04-26 PROCEDURE — 3017F COLORECTAL CA SCREEN DOC REV: CPT | Performed by: NURSE PRACTITIONER

## 2024-04-26 ASSESSMENT — ENCOUNTER SYMPTOMS
GASTROINTESTINAL NEGATIVE: 1
STRIDOR: 0
WHEEZING: 0
ALLERGIC/IMMUNOLOGIC NEGATIVE: 1
RESPIRATORY NEGATIVE: 1
DIARRHEA: 0
NAUSEA: 0
EYES NEGATIVE: 1
VOMITING: 0
CHEST TIGHTNESS: 0

## 2024-05-13 ENCOUNTER — OFFICE VISIT (OUTPATIENT)
Dept: CARDIOLOGY CLINIC | Age: 64
End: 2024-05-13
Payer: MEDICARE

## 2024-05-13 VITALS
HEIGHT: 69 IN | SYSTOLIC BLOOD PRESSURE: 124 MMHG | HEART RATE: 62 BPM | DIASTOLIC BLOOD PRESSURE: 78 MMHG | BODY MASS INDEX: 27.99 KG/M2 | WEIGHT: 189 LBS

## 2024-05-13 DIAGNOSIS — R07.2 PRECORDIAL PAIN: ICD-10-CM

## 2024-05-13 DIAGNOSIS — R07.89 CHEST DISCOMFORT: Primary | ICD-10-CM

## 2024-05-13 PROCEDURE — 3017F COLORECTAL CA SCREEN DOC REV: CPT | Performed by: NUCLEAR MEDICINE

## 2024-05-13 PROCEDURE — 1036F TOBACCO NON-USER: CPT | Performed by: NUCLEAR MEDICINE

## 2024-05-13 PROCEDURE — G8427 DOCREV CUR MEDS BY ELIG CLIN: HCPCS | Performed by: NUCLEAR MEDICINE

## 2024-05-13 PROCEDURE — 1123F ACP DISCUSS/DSCN MKR DOCD: CPT | Performed by: NUCLEAR MEDICINE

## 2024-05-13 PROCEDURE — 99204 OFFICE O/P NEW MOD 45 MIN: CPT | Performed by: NUCLEAR MEDICINE

## 2024-05-13 PROCEDURE — G8419 CALC BMI OUT NRM PARAM NOF/U: HCPCS | Performed by: NUCLEAR MEDICINE

## 2024-05-13 ASSESSMENT — ENCOUNTER SYMPTOMS
SHORTNESS OF BREATH: 1
CHEST TIGHTNESS: 0
BACK PAIN: 0
PHOTOPHOBIA: 0
DIARRHEA: 0
COLOR CHANGE: 0
ANAL BLEEDING: 0
CONSTIPATION: 0
ABDOMINAL DISTENTION: 0
NAUSEA: 0
VOMITING: 0
RECTAL PAIN: 0
ABDOMINAL PAIN: 0

## 2024-05-13 NOTE — PROGRESS NOTES
[facility] [place] [facility]  [facility] CARDIOLOGY  [address]  [address]  [facility] [address]  Dept: [phone]  Dept Fax: [phone]  Loc: [phone]    Visit Date: 5/13/2024    Jarad Barton is a 63 y.o. male who presents todayfor:  Chief Complaint   Patient presents with    New Patient     Discuss testing from 02/2024    Chest Pain   Here for the first time  Started with some GI epigastric type discomfort  He initially thought it was hernia related   Then seen General surgery for evaluation   Told possible inguinal hernia  Then had some chest discomfort  Had a stress test   Regular stress test was done  Had some chest discomfort on exertion   Some fatigue at times   Sleep apnea on CPAP   No ECg changes  No known CAD before  Untreated hyperlipidemia  Issues with the statins   No smoking   Family history of CAD     HPI:  Chest Pain   Associated symptoms include shortness of breath. Pertinent negatives include no abdominal pain, back pain, dizziness, nausea or vomiting.     Past Medical History:   Diagnosis Date    Hyperlipidemia     Inguinal hernia     Umbilical hernia 09/2020      Past Surgical History:   Procedure Laterality Date    [facility]  2016    [facility]    COLONOSCOPY  02/19/2021    Dr. Askew    HEMORRHOID SURGERY  2005    Dr Tena    NASAL SEPTUM SURGERY  2000    UMBILICAL HERNIA REPAIR N/A 09/08/2020    INCARCERATED UMBILICAL HERNIA REPAIR WITH MESH performed by Richard Butt MD at [facility] OR     Family History   Problem Relation Age of Onset    Dementia Mother     No Known Problems Father     No Known Problems Sister     No Known Problems Brother     No Known Problems Maternal Grandmother     No Known Problems Maternal Grandfather     No Known Problems Paternal Grandmother     No Known Problems Paternal Grandfather     No Known Problems Brother     No Known Problems Sister     No Known Problems Sister     No Known Problems Sister      Social History

## 2024-06-03 ENCOUNTER — HOSPITAL ENCOUNTER (OUTPATIENT)
Dept: NUCLEAR MEDICINE | Age: 64
Discharge: HOME | End: 2024-06-03
Attending: NUCLEAR MEDICINE
Payer: MEDICARE

## 2024-06-03 ENCOUNTER — HOSPITAL ENCOUNTER (OUTPATIENT)
Age: 64
Discharge: HOME | End: 2024-06-05
Attending: NUCLEAR MEDICINE
Payer: MEDICARE

## 2024-06-03 ENCOUNTER — TELEPHONE (OUTPATIENT)
Dept: CARDIOLOGY CLINIC | Age: 64
End: 2024-06-03

## 2024-06-03 VITALS
BODY MASS INDEX: 27.99 KG/M2 | WEIGHT: 189 LBS | SYSTOLIC BLOOD PRESSURE: 124 MMHG | HEIGHT: 69 IN | DIASTOLIC BLOOD PRESSURE: 78 MMHG

## 2024-06-03 DIAGNOSIS — R07.2 PRECORDIAL PAIN: ICD-10-CM

## 2024-06-03 DIAGNOSIS — R07.89 CHEST DISCOMFORT: ICD-10-CM

## 2024-06-03 DIAGNOSIS — R07.89 CHEST DISCOMFORT: Primary | ICD-10-CM

## 2024-06-03 DIAGNOSIS — R94.39 ABNORMAL STRESS TEST: ICD-10-CM

## 2024-06-03 LAB
ECHO AO ASC DIAM: 3.7 CM
ECHO AO ASCENDING AORTA INDEX: 1.83 CM/M2
ECHO AO SINUS VALSALVA DIAM: 3.2 CM
ECHO AO SINUS VALSALVA INDEX: 1.58 CM/M2
ECHO AO ST JNCT DIAM: 2.7 CM
ECHO AV CUSP MM: 2.1 CM
ECHO AV MEAN GRADIENT: 3 MMHG
ECHO AV MEAN VELOCITY: 0.8 M/S
ECHO AV PEAK GRADIENT: 6 MMHG
ECHO AV PEAK VELOCITY: 1.2 M/S
ECHO AV VELOCITY RATIO: 0.83
ECHO AV VTI: 26.7 CM
ECHO BSA: 2.04 M2
ECHO BSA: 2.04 M2
ECHO EST RA PRESSURE: 5 MMHG
ECHO LA AREA 2C: 14.3 CM2
ECHO LA AREA 4C: 13.6 CM2
ECHO LA DIAMETER INDEX: 1.73 CM/M2
ECHO LA DIAMETER: 3.5 CM
ECHO LA MAJOR AXIS: 4.7 CM
ECHO LA MINOR AXIS: 5 CM
ECHO LA VOL BP: 34 ML (ref 18–58)
ECHO LA VOL MOD A2C: 34 ML (ref 18–58)
ECHO LA VOL MOD A4C: 31 ML (ref 18–58)
ECHO LA VOL/BSA BIPLANE: 17 ML/M2 (ref 16–34)
ECHO LA VOLUME INDEX MOD A2C: 17 ML/M2 (ref 16–34)
ECHO LA VOLUME INDEX MOD A4C: 15 ML/M2 (ref 16–34)
ECHO LV E' LATERAL VELOCITY: 8 CM/S
ECHO LV E' SEPTAL VELOCITY: 7 CM/S
ECHO LV FRACTIONAL SHORTENING: 35 % (ref 28–44)
ECHO LV INTERNAL DIMENSION DIASTOLE INDEX: 2.13 CM/M2
ECHO LV INTERNAL DIMENSION DIASTOLIC: 4.3 CM (ref 4.2–5.9)
ECHO LV INTERNAL DIMENSION SYSTOLIC INDEX: 1.39 CM/M2
ECHO LV INTERNAL DIMENSION SYSTOLIC: 2.8 CM
ECHO LV ISOVOLUMETRIC RELAXATION TIME (IVRT): 74 MS
ECHO LV IVSD: 1.2 CM (ref 0.6–1)
ECHO LV MASS 2D: 173.6 G (ref 88–224)
ECHO LV MASS INDEX 2D: 86 G/M2 (ref 49–115)
ECHO LV POSTERIOR WALL DIASTOLIC: 1.1 CM (ref 0.6–1)
ECHO LV RELATIVE WALL THICKNESS RATIO: 0.51
ECHO LVOT AV VTI INDEX: 0.82
ECHO LVOT MEAN GRADIENT: 2 MMHG
ECHO LVOT PEAK GRADIENT: 4 MMHG
ECHO LVOT PEAK VELOCITY: 1 M/S
ECHO LVOT VTI: 21.9 CM
ECHO MV A VELOCITY: 0.9 M/S
ECHO MV E DECELERATION TIME (DT): 237 MS
ECHO MV E VELOCITY: 0.87 M/S
ECHO MV E/A RATIO: 0.97
ECHO MV E/E' LATERAL: 10.88
ECHO MV E/E' RATIO (AVERAGED): 11.65
ECHO MV E/E' SEPTAL: 12.43
ECHO MV REGURGITANT PEAK GRADIENT: 61 MMHG
ECHO MV REGURGITANT PEAK VELOCITY: 3.9 M/S
ECHO PV MAX VELOCITY: 0.6 M/S
ECHO PV PEAK GRADIENT: 1 MMHG
ECHO PVEIN A DURATION: 109 MS
ECHO PVEIN A VELOCITY: 0.3 M/S
ECHO PVEIN PEAK D VELOCITY: 0.4 M/S
ECHO PVEIN PEAK S VELOCITY: 0.6 M/S
ECHO PVEIN S/D RATIO: 1.5 NO UNITS
ECHO RIGHT VENTRICULAR SYSTOLIC PRESSURE (RVSP): 30 MMHG
ECHO RV INTERNAL DIMENSION: 3.2 CM
ECHO RV TAPSE: 2.9 CM (ref 1.7–?)
ECHO TV E WAVE: 0.6 M/S
ECHO TV REGURGITANT MAX VELOCITY: 2.51 M/S
ECHO TV REGURGITANT PEAK GRADIENT: 25 MMHG
NUC STRESS EJECTION FRACTION: 57 %
STRESS BASELINE DIAS BP: 78 MMHG
STRESS BASELINE HR: 49 BPM
STRESS BASELINE SYS BP: 150 MMHG
STRESS ESTIMATED WORKLOAD: 7 METS
STRESS EXERCISE DUR MIN: 6 MIN
STRESS EXERCISE DUR SEC: 0 SEC
STRESS PEAK DIAS BP: 83 MMHG
STRESS PEAK SYS BP: 197 MMHG
STRESS PERCENT HR ACHIEVED: 85 %
STRESS POST PEAK HR: 132 BPM
STRESS RATE PRESSURE PRODUCT: NORMAL BPM*MMHG
STRESS STAGE 1 BP: NORMAL MMHG
STRESS STAGE 1 DURATION: 3 MIN:SEC
STRESS STAGE 1 HR: 106 BPM
STRESS STAGE 2 BP: NORMAL MMHG
STRESS STAGE 2 COMMENTS: NORMAL
STRESS STAGE 2 DURATION: 3 MIN:SEC
STRESS STAGE 2 HR: 127 BPM
STRESS STAGE RECOVERY 1 BP: NORMAL MMHG
STRESS STAGE RECOVERY 1 DURATION: 1 MIN:SEC
STRESS STAGE RECOVERY 1 HR: 132 BPM
STRESS STAGE RECOVERY 2 BP: NORMAL MMHG
STRESS STAGE RECOVERY 2 DURATION: 1 MIN:SEC
STRESS STAGE RECOVERY 2 HR: 78 BPM
STRESS STAGE RECOVERY 3 COMMENTS: NORMAL
STRESS STAGE RECOVERY 4 BP: NORMAL MMHG
STRESS STAGE RECOVERY 4 DURATION: 3 MIN:SEC
STRESS STAGE RECOVERY 4 HR: 67 BPM
STRESS TARGET HR: 155 BPM

## 2024-06-03 PROCEDURE — 3430000000 HC RX DIAGNOSTIC RADIOPHARMACEUTICAL: Performed by: NUCLEAR MEDICINE

## 2024-06-03 PROCEDURE — 93306 TTE W/DOPPLER COMPLETE: CPT

## 2024-06-03 PROCEDURE — 78452 HT MUSCLE IMAGE SPECT MULT: CPT

## 2024-06-03 PROCEDURE — 93017 CV STRESS TEST TRACING ONLY: CPT

## 2024-06-03 PROCEDURE — A9500 TC99M SESTAMIBI: HCPCS | Performed by: NUCLEAR MEDICINE

## 2024-06-03 PROCEDURE — 93306 TTE W/DOPPLER COMPLETE: CPT | Performed by: NUCLEAR MEDICINE

## 2024-06-03 RX ORDER — TETRAKIS(2-METHOXYISOBUTYLISOCYANIDE)COPPER(I) TETRAFLUOROBORATE 1 MG/ML
9.9 INJECTION, POWDER, LYOPHILIZED, FOR SOLUTION INTRAVENOUS
Status: COMPLETED | OUTPATIENT
Start: 2024-06-03 | End: 2024-06-03

## 2024-06-03 RX ORDER — TETRAKIS(2-METHOXYISOBUTYLISOCYANIDE)COPPER(I) TETRAFLUOROBORATE 1 MG/ML
34.7 INJECTION, POWDER, LYOPHILIZED, FOR SOLUTION INTRAVENOUS
Status: COMPLETED | OUTPATIENT
Start: 2024-06-03 | End: 2024-06-03

## 2024-06-03 RX ADMIN — Medication 34.7 MILLICURIE: at 11:35

## 2024-06-03 RX ADMIN — Medication 9.9 MILLICURIE: at 10:16

## 2024-06-04 ENCOUNTER — TELEPHONE (OUTPATIENT)
Dept: CARDIOLOGY CLINIC | Age: 64
End: 2024-06-04

## 2024-06-04 PROBLEM — R94.39 ABNORMAL STRESS TEST: Status: ACTIVE | Noted: 2024-06-03

## 2024-06-04 NOTE — TELEPHONE ENCOUNTER
PROCEDURE: cardiac cath     DATE OF SERVICE: 06/07/2024    SERVICE LOCATION: [facility]     CPT CODE: 31363    PHYSICIAN: DR. NELSON     DATE PRIOR AUTH SUBMITTED: 06/04/2024    STATUS: approved.     CASE NUMBER: [id number]     AUTH NUMBER: [id number]    VALID:  06/07/2024-07/22/2024

## 2024-06-04 NOTE — TELEPHONE ENCOUNTER
Cath scheduled for 6/7/24 at 3:00 pt to arrive at 1:00.  Gave pt instructions and he voiced understanding.  Also emailed instructions

## 2024-06-06 ENCOUNTER — PREP FOR PROCEDURE (OUTPATIENT)
Dept: CARDIOLOGY | Age: 64
End: 2024-06-06

## 2024-06-06 RX ORDER — NITROGLYCERIN 0.4 MG/1
0.4 TABLET SUBLINGUAL EVERY 5 MIN PRN
Status: CANCELLED | OUTPATIENT
Start: 2024-06-06

## 2024-06-06 RX ORDER — ASPIRIN 325 MG
325 TABLET ORAL ONCE
Status: CANCELLED | OUTPATIENT
Start: 2024-06-06 | End: 2024-06-06

## 2024-06-06 RX ORDER — SODIUM CHLORIDE 9 MG/ML
INJECTION, SOLUTION INTRAVENOUS CONTINUOUS
Status: CANCELLED | OUTPATIENT
Start: 2024-06-06

## 2024-06-06 RX ORDER — SODIUM CHLORIDE 9 MG/ML
INJECTION, SOLUTION INTRAVENOUS PRN
Status: CANCELLED | OUTPATIENT
Start: 2024-06-06

## 2024-06-06 RX ORDER — SODIUM CHLORIDE 0.9 % (FLUSH) 0.9 %
5-40 SYRINGE (ML) INJECTION EVERY 12 HOURS SCHEDULED
Status: CANCELLED | OUTPATIENT
Start: 2024-06-06

## 2024-06-06 RX ORDER — SODIUM CHLORIDE 0.9 % (FLUSH) 0.9 %
5-40 SYRINGE (ML) INJECTION PRN
Status: CANCELLED | OUTPATIENT
Start: 2024-06-06

## 2024-06-07 ENCOUNTER — HOSPITAL ENCOUNTER (OUTPATIENT)
Age: 64
Discharge: HOME | End: 2024-06-08
Attending: NUCLEAR MEDICINE | Admitting: INTERNAL MEDICINE
Payer: MEDICARE

## 2024-06-07 DIAGNOSIS — R94.39 ABNORMAL STRESS TEST: ICD-10-CM

## 2024-06-07 PROBLEM — I25.10 CAD IN NATIVE ARTERY: Status: ACTIVE | Noted: 2024-06-07

## 2024-06-07 PROBLEM — Z95.820 STATUS POST ANGIOPLASTY WITH STENT: Status: ACTIVE | Noted: 2024-06-07

## 2024-06-07 LAB
ABO: NORMAL
ACTIVATED CLOTTING TIME: 330 SECONDS (ref 1–150)
ANION GAP SERPL CALC-SCNC: 10 MEQ/L (ref 8–16)
ANTIBODY SCREEN: NORMAL
APTT PPP: 35.6 SECONDS (ref 22–38)
BUN SERPL-MCNC: 15 MG/DL (ref 7–22)
CALCIUM SERPL-MCNC: 9.2 MG/DL (ref 8.5–10.5)
CHLORIDE SERPL-SCNC: 102 MEQ/L (ref 98–111)
CHOLEST SERPL-MCNC: 226 MG/DL (ref 100–199)
CO2 SERPL-SCNC: 28 MEQ/L (ref 23–33)
CREAT SERPL-MCNC: 0.9 MG/DL (ref 0.4–1.2)
DEPRECATED RDW RBC AUTO: 41.3 FL (ref 35–45)
ECHO BSA: 2.05 M2
ECHO BSA: 2.05 M2
EKG ATRIAL RATE: 60 BPM
EKG ATRIAL RATE: 60 BPM
EKG P AXIS: 23 DEGREES
EKG P AXIS: 50 DEGREES
EKG P-R INTERVAL: 146 MS
EKG P-R INTERVAL: 164 MS
EKG Q-T INTERVAL: 410 MS
EKG Q-T INTERVAL: 428 MS
EKG QRS DURATION: 84 MS
EKG QRS DURATION: 84 MS
EKG QTC CALCULATION (BAZETT): 410 MS
EKG QTC CALCULATION (BAZETT): 428 MS
EKG R AXIS: 41 DEGREES
EKG R AXIS: 56 DEGREES
EKG T AXIS: 43 DEGREES
EKG T AXIS: 47 DEGREES
EKG VENTRICULAR RATE: 60 BPM
EKG VENTRICULAR RATE: 60 BPM
ERYTHROCYTE [DISTWIDTH] IN BLOOD BY AUTOMATED COUNT: 12.7 % (ref 11.5–14.5)
GFR SERPL CREATININE-BSD FRML MDRD: > 90 ML/MIN/1.73M2
GLUCOSE SERPL-MCNC: 92 MG/DL (ref 70–108)
HCT VFR BLD AUTO: 50.9 % (ref 42–52)
HDLC SERPL-MCNC: 42 MG/DL
HGB BLD-MCNC: 16.5 GM/DL (ref 14–18)
INR PPP: 1.22 (ref 0.85–1.13)
LDLC SERPL CALC-MCNC: 150 MG/DL
MCH RBC QN AUTO: 29 PG (ref 26–33)
MCHC RBC AUTO-ENTMCNC: 32.4 GM/DL (ref 32.2–35.5)
MCV RBC AUTO: 89.5 FL (ref 80–94)
PLATELET # BLD AUTO: 241 THOU/MM3 (ref 130–400)
PMV BLD AUTO: 11.1 FL (ref 9.4–12.4)
POTASSIUM SERPL-SCNC: 4.4 MEQ/L (ref 3.5–5.2)
RBC # BLD AUTO: 5.69 MILL/MM3 (ref 4.7–6.1)
RH FACTOR: NORMAL
SODIUM SERPL-SCNC: 140 MEQ/L (ref 135–145)
TRIGL SERPL-MCNC: 170 MG/DL (ref 0–199)
WBC # BLD AUTO: 5.3 THOU/MM3 (ref 4.8–10.8)

## 2024-06-07 PROCEDURE — 86850 RBC ANTIBODY SCREEN: CPT

## 2024-06-07 PROCEDURE — 6360000004 HC RX CONTRAST MEDICATION: Performed by: NUCLEAR MEDICINE

## 2024-06-07 PROCEDURE — 99153 MOD SED SAME PHYS/QHP EA: CPT | Performed by: NUCLEAR MEDICINE

## 2024-06-07 PROCEDURE — 85730 THROMBOPLASTIN TIME PARTIAL: CPT

## 2024-06-07 PROCEDURE — 93010 ELECTROCARDIOGRAM REPORT: CPT | Performed by: NUCLEAR MEDICINE

## 2024-06-07 PROCEDURE — 85027 COMPLETE CBC AUTOMATED: CPT

## 2024-06-07 PROCEDURE — C1769 GUIDE WIRE: HCPCS | Performed by: NUCLEAR MEDICINE

## 2024-06-07 PROCEDURE — 93005 ELECTROCARDIOGRAM TRACING: CPT | Performed by: INTERNAL MEDICINE

## 2024-06-07 PROCEDURE — 93005 ELECTROCARDIOGRAM TRACING: CPT | Performed by: PHYSICIAN ASSISTANT

## 2024-06-07 PROCEDURE — C1725 CATH, TRANSLUMIN NON-LASER: HCPCS | Performed by: NUCLEAR MEDICINE

## 2024-06-07 PROCEDURE — 2580000003 HC RX 258: Performed by: PHYSICIAN ASSISTANT

## 2024-06-07 PROCEDURE — 6370000000 HC RX 637 (ALT 250 FOR IP): Performed by: INTERNAL MEDICINE

## 2024-06-07 PROCEDURE — 80048 BASIC METABOLIC PNL TOTAL CA: CPT

## 2024-06-07 PROCEDURE — 93458 L HRT ARTERY/VENTRICLE ANGIO: CPT | Performed by: NUCLEAR MEDICINE

## 2024-06-07 PROCEDURE — 80061 LIPID PANEL: CPT

## 2024-06-07 PROCEDURE — 99152 MOD SED SAME PHYS/QHP 5/>YRS: CPT | Performed by: NUCLEAR MEDICINE

## 2024-06-07 PROCEDURE — 86901 BLOOD TYPING SEROLOGIC RH(D): CPT

## 2024-06-07 PROCEDURE — 85347 COAGULATION TIME ACTIVATED: CPT

## 2024-06-07 PROCEDURE — C1874 STENT, COATED/COV W/DEL SYS: HCPCS | Performed by: NUCLEAR MEDICINE

## 2024-06-07 PROCEDURE — C1894 INTRO/SHEATH, NON-LASER: HCPCS | Performed by: NUCLEAR MEDICINE

## 2024-06-07 PROCEDURE — 36415 COLL VENOUS BLD VENIPUNCTURE: CPT

## 2024-06-07 PROCEDURE — 7100000010 HC PHASE II RECOVERY - FIRST 15 MIN: Performed by: NUCLEAR MEDICINE

## 2024-06-07 PROCEDURE — 92928 PRQ TCAT PLMT NTRAC ST 1 LES: CPT | Performed by: INTERNAL MEDICINE

## 2024-06-07 PROCEDURE — C1887 CATHETER, GUIDING: HCPCS | Performed by: NUCLEAR MEDICINE

## 2024-06-07 PROCEDURE — 86900 BLOOD TYPING SEROLOGIC ABO: CPT

## 2024-06-07 PROCEDURE — 2709999900 HC NON-CHARGEABLE SUPPLY: Performed by: NUCLEAR MEDICINE

## 2024-06-07 PROCEDURE — 85610 PROTHROMBIN TIME: CPT

## 2024-06-07 PROCEDURE — 6370000000 HC RX 637 (ALT 250 FOR IP): Performed by: PHYSICIAN ASSISTANT

## 2024-06-07 PROCEDURE — 6360000002 HC RX W HCPCS: Performed by: NUCLEAR MEDICINE

## 2024-06-07 PROCEDURE — C9600 PERC DRUG-EL COR STENT SING: HCPCS | Performed by: INTERNAL MEDICINE

## 2024-06-07 PROCEDURE — 6370000000 HC RX 637 (ALT 250 FOR IP): Performed by: NUCLEAR MEDICINE

## 2024-06-07 PROCEDURE — 2500000003 HC RX 250 WO HCPCS: Performed by: NUCLEAR MEDICINE

## 2024-06-07 PROCEDURE — 7100000011 HC PHASE II RECOVERY - ADDTL 15 MIN: Performed by: NUCLEAR MEDICINE

## 2024-06-07 DEVICE — STENT ONYXNG25015UX ONYX 2.50X15RX
Type: IMPLANTABLE DEVICE | Status: FUNCTIONAL
Brand: ONYX FRONTIER™

## 2024-06-07 RX ORDER — SODIUM CHLORIDE 0.9 % (FLUSH) 0.9 %
5-40 SYRINGE (ML) INJECTION EVERY 12 HOURS SCHEDULED
Status: DISCONTINUED | OUTPATIENT
Start: 2024-06-07 | End: 2024-06-08 | Stop reason: HOSPADM

## 2024-06-07 RX ORDER — NITROGLYCERIN 0.4 MG/1
0.4 TABLET SUBLINGUAL EVERY 5 MIN PRN
Status: DISCONTINUED | OUTPATIENT
Start: 2024-06-07 | End: 2024-06-07 | Stop reason: HOSPADM

## 2024-06-07 RX ORDER — ROSUVASTATIN CALCIUM 20 MG/1
20 TABLET, COATED ORAL NIGHTLY
Status: DISCONTINUED | OUTPATIENT
Start: 2024-06-07 | End: 2024-06-08 | Stop reason: HOSPADM

## 2024-06-07 RX ORDER — ASPIRIN 325 MG
325 TABLET ORAL ONCE
Status: COMPLETED | OUTPATIENT
Start: 2024-06-07 | End: 2024-06-07

## 2024-06-07 RX ORDER — ACETAMINOPHEN 325 MG/1
650 TABLET ORAL EVERY 4 HOURS PRN
Status: DISCONTINUED | OUTPATIENT
Start: 2024-06-07 | End: 2024-06-08 | Stop reason: HOSPADM

## 2024-06-07 RX ORDER — SODIUM CHLORIDE 9 MG/ML
INJECTION, SOLUTION INTRAVENOUS PRN
Status: DISCONTINUED | OUTPATIENT
Start: 2024-06-07 | End: 2024-06-08 | Stop reason: HOSPADM

## 2024-06-07 RX ORDER — SODIUM CHLORIDE 0.9 % (FLUSH) 0.9 %
5-40 SYRINGE (ML) INJECTION EVERY 12 HOURS SCHEDULED
Status: DISCONTINUED | OUTPATIENT
Start: 2024-06-07 | End: 2024-06-07 | Stop reason: SDUPTHER

## 2024-06-07 RX ORDER — ASPIRIN 81 MG/1
81 TABLET ORAL DAILY
Status: DISCONTINUED | OUTPATIENT
Start: 2024-06-08 | End: 2024-06-08 | Stop reason: HOSPADM

## 2024-06-07 RX ORDER — HEPARIN SODIUM 1000 [USP'U]/ML
INJECTION, SOLUTION INTRAVENOUS; SUBCUTANEOUS PRN
Status: DISCONTINUED | OUTPATIENT
Start: 2024-06-07 | End: 2024-06-07 | Stop reason: HOSPADM

## 2024-06-07 RX ORDER — SODIUM CHLORIDE 9 MG/ML
INJECTION, SOLUTION INTRAVENOUS CONTINUOUS
Status: DISCONTINUED | OUTPATIENT
Start: 2024-06-07 | End: 2024-06-08 | Stop reason: HOSPADM

## 2024-06-07 RX ORDER — LISINOPRIL 2.5 MG/1
2.5 TABLET ORAL DAILY
Status: DISCONTINUED | OUTPATIENT
Start: 2024-06-08 | End: 2024-06-08 | Stop reason: HOSPADM

## 2024-06-07 RX ORDER — SODIUM CHLORIDE 0.9 % (FLUSH) 0.9 %
5-40 SYRINGE (ML) INJECTION PRN
Status: DISCONTINUED | OUTPATIENT
Start: 2024-06-07 | End: 2024-06-08 | Stop reason: HOSPADM

## 2024-06-07 RX ORDER — SODIUM CHLORIDE 0.9 % (FLUSH) 0.9 %
5-40 SYRINGE (ML) INJECTION PRN
Status: DISCONTINUED | OUTPATIENT
Start: 2024-06-07 | End: 2024-06-07

## 2024-06-07 RX ORDER — SODIUM CHLORIDE 9 MG/ML
INJECTION, SOLUTION INTRAVENOUS CONTINUOUS
Status: DISCONTINUED | OUTPATIENT
Start: 2024-06-07 | End: 2024-06-07 | Stop reason: HOSPADM

## 2024-06-07 RX ORDER — MIDAZOLAM HYDROCHLORIDE 1 MG/ML
INJECTION INTRAMUSCULAR; INTRAVENOUS PRN
Status: DISCONTINUED | OUTPATIENT
Start: 2024-06-07 | End: 2024-06-07 | Stop reason: HOSPADM

## 2024-06-07 RX ORDER — FENTANYL CITRATE 50 UG/ML
INJECTION, SOLUTION INTRAMUSCULAR; INTRAVENOUS PRN
Status: DISCONTINUED | OUTPATIENT
Start: 2024-06-07 | End: 2024-06-07 | Stop reason: HOSPADM

## 2024-06-07 RX ORDER — SODIUM CHLORIDE 9 MG/ML
INJECTION, SOLUTION INTRAVENOUS PRN
Status: DISCONTINUED | OUTPATIENT
Start: 2024-06-07 | End: 2024-06-07 | Stop reason: SDUPTHER

## 2024-06-07 RX ADMIN — SODIUM CHLORIDE: 9 INJECTION, SOLUTION INTRAVENOUS at 13:28

## 2024-06-07 RX ADMIN — ASPIRIN 325 MG: 325 TABLET ORAL at 14:29

## 2024-06-07 RX ADMIN — ROSUVASTATIN CALCIUM 20 MG: 20 TABLET, FILM COATED ORAL at 21:42

## 2024-06-07 NOTE — H&P
[facility]  Sedation/Analgesia History & Physical    Pt Name: Jarad Barton  Account number: [id number]  MRN: [id number]  YOB: 1960  Provider Performing Procedure: Bee Aguayo MD MD [facility]  Primary Care Physician: Rose Marie Weaver, APRN - CNP  Date: 6/7/2024    PRE-PROCEDURE    Code Status: FULL CODE  Brief History/Pre-Procedure Diagnosis:   Angina      Consent: : I have discussed with the patient risks, benefits, and alternatives (and relevant risks, benefits, and side effects related to alternatives or not receiving care), and likelihood of the success.   The patient and/or representative appear to understand and agree to proceed.  The discussion encompasses risks, benefits, and side effects related to the alternatives and the risks related to not receiving the proposed care, treatment, and services.         MEDICAL HISTORY  []ASHD/ANGINA/MI/CHF   [x]Hypertension  []Diabetes  []Hyperlipidemia  []Smoking  []Family Hx of ASHD  []Additional information:       has a past medical history of Hyperlipidemia, Inguinal hernia, and Umbilical hernia.    SURGICAL HISTORY   has a past surgical history that includes Nasal septum surgery (2000); Hemorrhoid surgery (2005); Colonoscopy (2016); Umbilical hernia repair (N/A, 09/08/2020); and Colonoscopy (02/19/2021).  Additional information:       ALLERGIES   Allergies as of 06/04/2024    (No Known Allergies)     Additional information:       MEDICATIONS   Aspirin  [x] 81 mg  [] 325 mg  [] None  Antiplatelet drug therapy use last 5 days  []No []Yes  Coumadin Use Last 5 Days []No []Yes  Other anticoagulant use last 5 days  []No []Yes    Current Facility-Administered Medications:     0.9 % sodium chloride infusion, , IntraVENous, Continuous, Lorri Kwok PA-C    sodium chloride flush 0.9 % injection 5-40 mL, 5-40 mL, IntraVENous, 2 times per day, Lorri Kwok PA-C    sodium chloride flush 0.9 % injection 5-40 mL, 5-40 mL,  IntraVENous, PRN, Lorri Kwok, PA-C    0.9 % sodium chloride infusion, , IntraVENous, PRN, Lorri Kwok, PA-C    aspirin tablet 325 mg, 325 mg, Oral, Once, Lorri Kwok PA-C    nitroGLYCERIN (NITROSTAT) SL tablet 0.4 mg, 0.4 mg, SubLINGual, Q5 Min PRN, Lorri Kwok PA-C  Prior to Admission medications    Not on File     Additional information:       VITAL SIGNS   There were no vitals filed for this visit.    PHYSICAL:   General: No acute distress  HEENT:  Unremarkable for age  Neck: without increased JVD, carotid pulses 2+ bilaterally without bruits  Heart: RRR, S1 & S2 WNL, S4 gallop, without murmurs or rubs    Lungs: Clear to auscultation    Abdomen: BS present, without HSM, masses, or tenderness    Extremities: without C,C,E.  Pulses 2+ bilaterally  Mental Status: Alert & Oriented        PLANNED PROCEDURE   [x]Cath  []PCI                []Pacemaker/AICD  []BRYCE             []Cardioversion []Peripheral angiography/PTA  []Other:      SEDATION  Planned agent:[x]Midazolam []Meperidine [x]Sublimaze []Morphine  []Diazepam  []Other:     ASA Classification:  []1 [x]2 []3 []4 []5  Class 1: A normal healthy patient  Class 2: Pt with mild to moderate systemic disease  Class 3: Severe systemic disease or disturbance  Class 4: Severe systemic disorders that are already life threatening.  Class 5: Moribund pt with little chances of survival, for more than 24 hours.  Mallampati I Airway Classification:   []1 [x]2 []3 []4    [x]Pre-procedure diagnostic studies complete and results available.   Comment:    [x]Previous sedation/anesthesia experiences assessed.   Comment:    [x]The patient is an appropriate candidate to undergo the planned procedure sedation and anesthesia. (Refer to nursing sedation/analgesia documentation record)  [x]Formulation and discussion of sedation/procedure plan, risks, and expectations with patient and/or responsible adult completed.  [x]Patient examined immediately prior to the

## 2024-06-07 NOTE — PROGRESS NOTES
1319 Patient admitted to 2E10   Ambulatory for heart cath.  Patient NPO. Patient accompanied by wife.  Vital signs obtained.   Assessment and data collection intiated.   Oriented to room.  Policies and procedures for 2E explained.   All questions answered with no further questions at this time.   Fall prevention and safety precautions discussed with patient.         1320 Care plan reviewed with patient and wife.  Patient and wife verbalize understanding of the plan of care and contribute to goal setting.       1510 To cath lab per bed, stable condition.         1605 Care taken over from cath lab. Radial site stable, no bleeding seen, site soft.  Fingers jenifer, good pleth and capillary refill. Armboard continued with vascband. Patient instructed not to bend wrist, not to put pressure on wrist and not to lift  or twist with wrist. Patient voices understanding.       Patient received and reviewed booklet \"How to care for your heart after coronary artery disease\". Reviewed how to take care of the incision site, the importance of participating in cardiac rehab and the hours of operations. Reviewed importance of reducing coronary artery disease risk factors.   Stent card and brilinta coupon given to patient.     1746 8ab called attempted to call report, spoke with Autumn, will return call once she decides who is taking patient.      1756 Sam Dukes called, report given to him, patient going to 8A17. Patient aware of new room number.     1800 first 2 ml removed from vascband and fingers normal in color, no complaints.     Bandaid applied to site at 1845 and vascband removed, armboard continued, site stable.   Patient instructed not to bend, twist, lift, push or pull with right wrist, voices understanding.   Wife and daughters here aware of upcoming transfer and new room number.    1910 to 8a17 per wheelchair, stable condition, with family and belongings.

## 2024-06-08 VITALS
RESPIRATION RATE: 16 BRPM | HEART RATE: 56 BPM | SYSTOLIC BLOOD PRESSURE: 107 MMHG | DIASTOLIC BLOOD PRESSURE: 71 MMHG | BODY MASS INDEX: 28.08 KG/M2 | OXYGEN SATURATION: 96 % | HEIGHT: 69 IN | WEIGHT: 189.6 LBS | TEMPERATURE: 98.3 F

## 2024-06-08 LAB
ANION GAP SERPL CALC-SCNC: 7 MEQ/L (ref 8–16)
BUN SERPL-MCNC: 13 MG/DL (ref 7–22)
CALCIUM SERPL-MCNC: 8.6 MG/DL (ref 8.5–10.5)
CHLORIDE SERPL-SCNC: 104 MEQ/L (ref 98–111)
CO2 SERPL-SCNC: 29 MEQ/L (ref 23–33)
CREAT SERPL-MCNC: 0.8 MG/DL (ref 0.4–1.2)
DEPRECATED RDW RBC AUTO: 41.1 FL (ref 35–45)
ERYTHROCYTE [DISTWIDTH] IN BLOOD BY AUTOMATED COUNT: 12.7 % (ref 11.5–14.5)
GFR SERPL CREATININE-BSD FRML MDRD: > 90 ML/MIN/1.73M2
GLUCOSE SERPL-MCNC: 95 MG/DL (ref 70–108)
HCT VFR BLD AUTO: 47.4 % (ref 42–52)
HGB BLD-MCNC: 15.5 GM/DL (ref 14–18)
MCH RBC QN AUTO: 29.1 PG (ref 26–33)
MCHC RBC AUTO-ENTMCNC: 32.7 GM/DL (ref 32.2–35.5)
MCV RBC AUTO: 89.1 FL (ref 80–94)
PLATELET # BLD AUTO: 226 THOU/MM3 (ref 130–400)
PMV BLD AUTO: 11.1 FL (ref 9.4–12.4)
POTASSIUM SERPL-SCNC: 4.8 MEQ/L (ref 3.5–5.2)
RBC # BLD AUTO: 5.32 MILL/MM3 (ref 4.7–6.1)
SODIUM SERPL-SCNC: 140 MEQ/L (ref 135–145)
WBC # BLD AUTO: 6.8 THOU/MM3 (ref 4.8–10.8)

## 2024-06-08 PROCEDURE — 85027 COMPLETE CBC AUTOMATED: CPT

## 2024-06-08 PROCEDURE — 36415 COLL VENOUS BLD VENIPUNCTURE: CPT

## 2024-06-08 PROCEDURE — 99232 SBSQ HOSP IP/OBS MODERATE 35: CPT | Performed by: STUDENT IN AN ORGANIZED HEALTH CARE EDUCATION/TRAINING PROGRAM

## 2024-06-08 PROCEDURE — 80048 BASIC METABOLIC PNL TOTAL CA: CPT

## 2024-06-08 PROCEDURE — 6370000000 HC RX 637 (ALT 250 FOR IP): Performed by: INTERNAL MEDICINE

## 2024-06-08 RX ORDER — LISINOPRIL 2.5 MG/1
2.5 TABLET ORAL DAILY
Qty: 30 TABLET | Refills: 3 | Status: SHIPPED | OUTPATIENT
Start: 2024-06-08 | End: 2024-06-10

## 2024-06-08 RX ORDER — ASPIRIN 81 MG/1
81 TABLET ORAL DAILY
Qty: 30 TABLET | Refills: 3 | Status: SHIPPED | OUTPATIENT
Start: 2024-06-08

## 2024-06-08 RX ORDER — ROSUVASTATIN CALCIUM 20 MG/1
20 TABLET, COATED ORAL NIGHTLY
Qty: 30 TABLET | Refills: 3 | Status: SHIPPED | OUTPATIENT
Start: 2024-06-08

## 2024-06-08 RX ADMIN — ASPIRIN 81 MG: 81 TABLET, COATED ORAL at 08:15

## 2024-06-08 RX ADMIN — METOPROLOL TARTRATE 12.5 MG: 25 TABLET, FILM COATED ORAL at 08:15

## 2024-06-08 RX ADMIN — LISINOPRIL 2.5 MG: 2.5 TABLET ORAL at 10:02

## 2024-06-08 RX ADMIN — TICAGRELOR 90 MG: 90 TABLET ORAL at 05:09

## 2024-06-08 NOTE — PLAN OF CARE
Problem: ABCDS Injury Assessment  Goal: Absence of physical injury  6/7/2024 2106 by Alexandria Mccarthy RN  Outcome: Progressing  6/7/2024 2106 by Alexandria Mccarthy RN  Outcome: Progressing     Problem: Discharge Planning  Goal: Discharge to home or other facility with appropriate resources  6/7/2024 2106 by Alexandria Mccarthy RN  Outcome: Progressing  6/7/2024 2106 by Alexandria Mccarthy RN  Outcome: Progressing     Problem: Safety - Adult  Goal: Free from fall injury  Outcome: Progressing     Problem: Pain  Goal: Verbalizes/displays adequate comfort level or baseline comfort level  Outcome: Progressing

## 2024-06-08 NOTE — PROGRESS NOTES
Patient discharged home with wife. Educated on discharge instructions, follow ups and medications. No questions or concerns voiced.   Heart attack teaching covered with patient and/or family or significant other:  Signs and symptoms of a heart attack.  When to call 911 and the importance of calling 911.  Personal risk factors and ways to lower their risk.  4.   Importance of quitting smoking if applicable.     Heart attack booklet given to the patient and/or family or significant other. Reviewed:  How to take Nitroglycerin.  The importance of participating in Cardiac Rehab and hours of operation.   Heart Healthy Diet.  Risk factor modification.(Overweight, Obesity, Diabetes, Hypertension, Smoking, High Cholesterol, Stress)  Discharge instructions for Cath/Intervention procedure site if applicable.

## 2024-06-08 NOTE — DISCHARGE INSTRUCTIONS
F/u with Dr regan's office in 2-4 weks.     Do not stop taking your blood thinner without talking to your doctor (brilinta or plavix)     Discharge Instructions for Radial Heart Catherization    1.  Take it easy for 3-4 days.  2.  No driving for 2 days.  3.  No lifting of 5 lbs or more for 5 days with the affected arm.  4.  Can shower after 24 hours.  5.  Remove arm board after 24 hours.  6.  Apply a band aid to the insertion site daily for 5 days.  May apply antibiotic ointment if desired, but not necessary.  Wash site daily with soap and water.  7.  No creams, ointments, or powders near the insertion site.   8.  No tub baths, swimming, hot tubs, or hand washing dishes for 1 week.  9.  Watch for signs of infection (redness, warmth, swelling, or pus drainage) or coolness of extremity and call physician if this occurs  10.  If bleeding occurs from insertion site, apply pressure and call 911.

## 2024-06-08 NOTE — PROGRESS NOTES
Cardiology Progress Note      Patient:  Jarad Barton  YOB: 1960  MRN: [id number]   Acct: [id number]  Admit Date:  6/7/2024  Primary Cardiologist: Bee Aguayo MD    Patient presented for OP elective LHC, s/p PCi to OM    Subjective (Events in last 24 hours):   Pt awake, alert. NAD. Denies cp or sob, no swelling. D/w patient about importance of brilinta and asa for new heart stents. D/w patient about cardiac rehab will reach out. Recommend this. They understand to monitor and watch cath site for any new or worsening pain/swelling/etc and return to ED if experiencing any. They will f/u in 1-3 weeks in the office per scheduling.     Right radial-no ecchymosis, nontender, no edema, NVI   Objective:   /67   Pulse 53   Temp 97.7 °F (36.5 °C) (Oral)   Resp 18   Ht 1.753 m (5' 9\")   Wt 86 kg (189 lb 9.5 oz)   SpO2 99%   BMI 28.00 kg/m²      vss  TELEMETRY: nsr     Physical Exam:  General Appearance: alert and oriented to person, place and time, in no acute distress  Cardiovascular: normal rate, regular rhythm, normal S1 and S2, no murmurs, rubs, clicks, or gallops, distal pulses intact, no carotid bruits, no JVD  Pulmonary/Chest: clear to auscultation bilaterally- no wheezes, rales or rhonchi, normal air movement, no respiratory distress  Abdomen: soft, non-tender, non-distended, normal bowel sounds, no masses   Extremities: no cyanosis, clubbing or edema, pulse   Skin: warm and dry, no rash or erythema  Neurological: alert, oriented, normal speech, no focal findings or movement disorder noted    Medications:    sodium chloride flush  5-40 mL IntraVENous 2 times per day    metoprolol tartrate  12.5 mg Oral BID    lisinopril  2.5 mg Oral Daily    rosuvastatin  20 mg Oral Nightly    aspirin  81 mg Oral Daily    ticagrelor  90 mg Oral BID      sodium chloride      sodium chloride       sodium chloride flush, 5-40 mL, PRN  sodium chloride, , PRN  acetaminophen, 650 mg, Q4H  Hypertension

## 2024-06-10 ENCOUNTER — TELEPHONE (OUTPATIENT)
Dept: CARDIAC REHAB | Age: 64
End: 2024-06-10

## 2024-06-10 ENCOUNTER — TELEPHONE (OUTPATIENT)
Dept: CARDIOLOGY CLINIC | Age: 64
End: 2024-06-10

## 2024-06-10 NOTE — TELEPHONE ENCOUNTER
Called Jarad because we were unable to see him when was here for his PCI.  Explained that we received consult for Phase II Cardiac Rehabilitation from Dr. Edwards.  Importance of Cardiac Rehab discussed with patient.  Reviewed cardiac rehab class times.  Patient questions answered.  Scheduled for Cardiac rehab evaluation on 6/20/24 @ 8:00 AM.

## 2024-06-10 NOTE — TELEPHONE ENCOUNTER
Medication list up dated. Patient notified. Verbalized understanding. Will discontinue metoprolol and lisinopril. Will keep bp log and bring to f/u appt.

## 2024-06-10 NOTE — TELEPHONE ENCOUNTER
Was instructed to hold if HR less than 60.  FYI is not taking metoprolol d/t HR do you want to try anything else?    HR   47  53  47    BP  112/65  112/62  99/46

## 2024-06-20 ENCOUNTER — HOSPITAL ENCOUNTER (OUTPATIENT)
Dept: CARDIAC REHAB | Age: 64
Setting detail: THERAPIES SERIES
Discharge: HOME | End: 2024-06-20
Payer: MEDICARE

## 2024-06-20 VITALS — WEIGHT: 186 LBS | BODY MASS INDEX: 27.55 KG/M2 | HEIGHT: 69 IN

## 2024-06-20 PROCEDURE — G0422 INTENS CARDIAC REHAB W/EXERC: HCPCS

## 2024-06-20 PROCEDURE — G0423 INTENS CARDIAC REHAB NO EXER: HCPCS

## 2024-06-20 ASSESSMENT — PATIENT HEALTH QUESTIONNAIRE - PHQ9
10. IF YOU CHECKED OFF ANY PROBLEMS, HOW DIFFICULT HAVE THESE PROBLEMS MADE IT FOR YOU TO DO YOUR WORK, TAKE CARE OF THINGS AT HOME, OR GET ALONG WITH OTHER PEOPLE: NOT DIFFICULT AT ALL
6. FEELING BAD ABOUT YOURSELF - OR THAT YOU ARE A FAILURE OR HAVE LET YOURSELF OR YOUR FAMILY DOWN: NOT AT ALL
SUM OF ALL RESPONSES TO PHQ QUESTIONS 1-9: 6
2. FEELING DOWN, DEPRESSED OR HOPELESS: SEVERAL DAYS
SUM OF ALL RESPONSES TO PHQ QUESTIONS 1-9: 6
8. MOVING OR SPEAKING SO SLOWLY THAT OTHER PEOPLE COULD HAVE NOTICED. OR THE OPPOSITE, BEING SO FIGETY OR RESTLESS THAT YOU HAVE BEEN MOVING AROUND A LOT MORE THAN USUAL: NOT AT ALL
5. POOR APPETITE OR OVEREATING: SEVERAL DAYS
3. TROUBLE FALLING OR STAYING ASLEEP: NOT AT ALL
SUM OF ALL RESPONSES TO PHQ QUESTIONS 1-9: 6
1. LITTLE INTEREST OR PLEASURE IN DOING THINGS: SEVERAL DAYS
SUM OF ALL RESPONSES TO PHQ9 QUESTIONS 1 & 2: 2
SUM OF ALL RESPONSES TO PHQ QUESTIONS 1-9: 6
4. FEELING TIRED OR HAVING LITTLE ENERGY: MORE THAN HALF THE DAYS
9. THOUGHTS THAT YOU WOULD BE BETTER OFF DEAD, OR OF HURTING YOURSELF: NOT AT ALL
7. TROUBLE CONCENTRATING ON THINGS, SUCH AS READING THE NEWSPAPER OR WATCHING TELEVISION: SEVERAL DAYS

## 2024-06-20 NOTE — PLAN OF CARE
telemetry monitoring and notify me with any concerns   [] Other     Physician Response    [x] Cardiac rehab is reasonably and medically necessary for continuous cardiac monitoring surveillance  of patient's cardiac activity  [x] Continue continuous telemetry monitoring and notify me with any concerns   [] Other

## 2024-06-20 NOTE — PROGRESS NOTES
Video Education Report - ICR/CR  Name:  Jarad Barton     Date:  6/20/2024  MRN: [id number]     Session #:  1  Session Length: 45 min    Core Videos        []Heart Disease Risk Reduction       [x]Overview of Pritikin Eating Plan      []Move it          []Calorie Density         []Healthy Minds, Bodies, Hearts        []Label Reading - Part 1       []Metabolic Syndrome and Belly Fat        []How Our Thoughts Can Heal Our Hearts   []Dining Out - Part 1      []Biomechanical Limitations  []Facts on Fat        []Hypertension & Heart Disease    []Diseases of Our Time - Focusing on Diabetes   []Body Composition  []Nutrition Action Plan    []Exercise Action Plan      Comments:  Video and program orientation completed.

## 2024-06-25 ENCOUNTER — HOSPITAL ENCOUNTER (OUTPATIENT)
Dept: CARDIAC REHAB | Age: 64
Setting detail: THERAPIES SERIES
Discharge: HOME | End: 2024-06-25
Payer: MEDICARE

## 2024-06-25 ENCOUNTER — OFFICE VISIT (OUTPATIENT)
Dept: CARDIOLOGY CLINIC | Age: 64
End: 2024-06-25
Payer: MEDICARE

## 2024-06-25 VITALS
HEIGHT: 69 IN | DIASTOLIC BLOOD PRESSURE: 72 MMHG | BODY MASS INDEX: 27.25 KG/M2 | HEART RATE: 52 BPM | SYSTOLIC BLOOD PRESSURE: 120 MMHG | WEIGHT: 184 LBS

## 2024-06-25 DIAGNOSIS — E78.00 PURE HYPERCHOLESTEROLEMIA: ICD-10-CM

## 2024-06-25 DIAGNOSIS — Z95.820 S/P ANGIOPLASTY WITH STENT: Primary | ICD-10-CM

## 2024-06-25 PROCEDURE — 99214 OFFICE O/P EST MOD 30 MIN: CPT | Performed by: PHYSICIAN ASSISTANT

## 2024-06-25 PROCEDURE — G0422 INTENS CARDIAC REHAB W/EXERC: HCPCS

## 2024-06-25 PROCEDURE — 1123F ACP DISCUSS/DSCN MKR DOCD: CPT | Performed by: PHYSICIAN ASSISTANT

## 2024-06-25 PROCEDURE — G0423 INTENS CARDIAC REHAB NO EXER: HCPCS

## 2024-06-25 NOTE — PROGRESS NOTES
[facility] [place] [facility]  [facility] CARDIOLOGY  [address]  [address]  [place] [address]  Dept: [phone]  Dept Fax: [phone]  Loc: [phone]    Chief Complaint   Patient presents with    Follow Up After Procedure     CATH W/STENT     Patient presents for follow-up appointment after recent PCI.  Patient did have some hypotension after his PCI and his lisinopril was stopped.  He is also on a decreased dose of metoprolol.  He states he has felt better since stopping the lisinopril.  He denies any chest pain, shortness of breath or palpitations.  He has started cardiac rehab today.  Cardiologist:  Dr. Ward        General:   No fever, no chills, No fatigue or weight loss  Pulmonary:    No dyspnea, no wheezing  Cardiac:    Denies recent chest pain   GI:     No nausea or vomiting, no abdominal pain  Neuro:    No dizziness or light headedness  Musculoskeletal:  No recent active issues  Extremities:   No edema, good peripheral pulses      Past Medical History:   Diagnosis Date    Hyperlipidemia     Inguinal hernia     Umbilical hernia 09/2020       No Known Allergies    Current Outpatient Medications   Medication Sig Dispense Refill    metoprolol tartrate (LOPRESSOR) 25 MG tablet Take 0.5 tablets by mouth 2 times daily      aspirin 81 MG EC tablet Take 1 tablet by mouth daily 30 tablet 3    rosuvastatin (CRESTOR) 20 MG tablet Take 1 tablet by mouth nightly 30 tablet 3    ticagrelor (BRILINTA) 90 MG TABS tablet Take 1 tablet by mouth 2 times daily 60 tablet 6     No current facility-administered medications for this visit.       Social History     Socioeconomic History    Marital status:      Spouse name: Giovanna    Number of children: 4   Tobacco Use    Smoking status: Never    Smokeless tobacco: Never    Tobacco comments:     Never smoker    Vaping Use    Vaping Use: Never used   Substance and Sexual Activity    Alcohol use: Yes     Comment: occ drinker    Drug use:

## 2024-06-25 NOTE — PROGRESS NOTES
HEART CATH WITH STENT.    DENIES CP, SOB, PALPITATIONS, DIZZINESS AND TI.    C/O OCCASIONAL LIGHTHEADEDNESS.

## 2024-06-25 NOTE — PROGRESS NOTES
Video Education Report - ICR/CR  Name:  Jarad Barton     Date:  6/25/2024  MRN: [id number]     Session #:    Session Length: 40 min    Core Videos        []Heart Disease Risk Reduction       [x]Overview of Pritikin Eating Plan      []Move it          []Calorie Density         []Healthy Minds, Bodies, Hearts        []Label Reading - Part 1       []Metabolic Syndrome and Belly Fat        []How Our Thoughts Can Heal Our Hearts   []Dining Out - Part 1      []Biomechanical Limitations  []Facts on Fat        []Hypertension & Heart Disease    []Diseases of Our Time - Focusing on Diabetes   []Body Composition  []Nutrition Action Plan    []Exercise Action Plan    Exercise      Healthy Mind-Set  []Improving Performance    []Smoking Cessation    []Introduction to Yoga    Medical      Culinary  []Aging-Enhancing the Quality of Your Life  []Becoming a Pritikin   []Biology of Weight Control    []Cooking Breakfasts   []Decoding Lab Results    and Snacks  []Diseases of Our Time - Overview   []Cooking Dinner and   []Sleep Disorders     Sides  []Targeting Your Nutrition Priorities   []Healthy Salads &   Dressings  Nutrition      []Cooking Soups and   []Dining Out - Part 2     Desserts  []Fueling a Healthy Body   []Menu Workshop     Overview  []Planning Your Eating Strategy   []The Pritikin Solution  []Vitamins and Minerals    Comments:  Video completed, group discussion

## 2024-06-27 ENCOUNTER — HOSPITAL ENCOUNTER (OUTPATIENT)
Dept: CARDIAC REHAB | Age: 64
Setting detail: THERAPIES SERIES
Discharge: HOME | End: 2024-06-27
Payer: MEDICARE

## 2024-06-27 PROCEDURE — G0422 INTENS CARDIAC REHAB W/EXERC: HCPCS

## 2024-06-27 PROCEDURE — G0423 INTENS CARDIAC REHAB NO EXER: HCPCS

## 2024-06-27 NOTE — PROGRESS NOTES
Jarad WOODS.:  1960    Acct Number: [id number]   MRN:  [id number]                             [facility] NUTRITION WORKSHOP             Date: 2024        Session # _______    Today’s class covered:    []   Fueling a Healthy Body  []   Mindful Eating  [x]   Targeting Nutrition Priorities  []   Dining Out :  Making the Most of a Menu  []   Label Reading    Readiness to change:    []  Pre-contemplative   []  Contemplative - ambivalent about change    [x]  Action - ready to set action plan and implement   []  Maintenance - has made change and is trying, and or practicing different alternative         behaviors     Jarad was in the Workshop with the Dietitian for 55 minutes.  The content was presented via Powerpoint, lecture, and patient participation based format.  Motivational interviewing was utilized when needed, to promote change.  Patient voiced understanding.    Electronically signed by Willow Pratt RD on 2024 at 9:48 AM

## 2024-07-02 ENCOUNTER — APPOINTMENT (OUTPATIENT)
Dept: CARDIAC REHAB | Age: 64
End: 2024-07-02
Payer: MEDICARE

## 2024-07-02 ENCOUNTER — HOSPITAL ENCOUNTER (OUTPATIENT)
Dept: CARDIAC REHAB | Age: 64
Setting detail: THERAPIES SERIES
End: 2024-07-02
Payer: MEDICARE

## 2024-07-04 ENCOUNTER — APPOINTMENT (OUTPATIENT)
Dept: CARDIAC REHAB | Age: 64
End: 2024-07-04
Payer: MEDICARE

## 2024-07-04 ENCOUNTER — HOSPITAL ENCOUNTER (OUTPATIENT)
Dept: CARDIAC REHAB | Age: 64
Setting detail: THERAPIES SERIES
End: 2024-07-04
Payer: MEDICARE

## 2024-07-09 ENCOUNTER — HOSPITAL ENCOUNTER (OUTPATIENT)
Dept: CARDIAC REHAB | Age: 64
Setting detail: THERAPIES SERIES
Discharge: HOME | End: 2024-07-09
Payer: MEDICARE

## 2024-07-09 PROCEDURE — G0422 INTENS CARDIAC REHAB W/EXERC: HCPCS

## 2024-07-09 PROCEDURE — G0423 INTENS CARDIAC REHAB NO EXER: HCPCS

## 2024-07-09 NOTE — PROGRESS NOTES
Jarad MCDERMOTT Mick FERGUSONO.B.:  1960    Acct Number: [id number]   MRN:  [id number]                             [facility] NUTRITION 1:1 Counseling             Date: 2024       Session # _______   1:1 Counseling Session    GOALS:  Weight loss (abdominal fat)  2.   Eat healthier  Jarad reports making some changes since starting [facility] including watching what he's eating more, eating less, reading labels to avoid saturated fats, avoiding unhealthy snacks, keeping stocked w/ healthier foods (cauliflower vs. Potato chips), switching to whole grain bread and pasta and brown rice.    Reported Weight Trends: UBW was 190#, has been losing weight over the last 3 weeks (180#s today; BMI 27.17)  Edema:   Lab Trends: 24 Tl chol 226, , HDL 42, Tga 170   Rate Your Plate: 38    Receptiveness to education/goals:  (x) Agreeable ( ) No Interest   ( ) Refused  Evaluation of education:  ( ) Indicates understanding   ( ) Needs reinforcement     () Unsuccessful     Readiness to change:    ( ) Pre-contemplative   ( ) Contemplative - ambivalent about change    ( ) Action - ready to set action plan and implement   (x) Maintenance - has made change and is trying, and or practicing different alternative behaviors     Exercise Habits:  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    Food Recall:  Breakfast:  6am coffee w/ skim milk, strawberries  Lunch:  11:30 Walleye w/ peas, watermelon  Dinner:  9pm [usually 6pm] watermelon, strawberries, fried rice and grilled chicken (leftovers)  Snacks:  no snacks yesterday [typically chooses pretzels (yogurt-dipped)/pecans/peanuts]  Main Beverages:  water, coffee    Grocery Shopping: wife  Meal Prep/Cooking: wife/ he does once in a while  Dining Out: 1X/wk [[facility] in [place], [place])    Jarad was counseled by the Dietitian for 60 minutes. Motivational Interviewing was used to

## 2024-07-11 ENCOUNTER — HOSPITAL ENCOUNTER (OUTPATIENT)
Dept: CARDIAC REHAB | Age: 64
Setting detail: THERAPIES SERIES
Discharge: HOME | End: 2024-07-11
Payer: MEDICARE

## 2024-07-11 PROCEDURE — G0423 INTENS CARDIAC REHAB NO EXER: HCPCS

## 2024-07-11 PROCEDURE — G0422 INTENS CARDIAC REHAB W/EXERC: HCPCS

## 2024-07-11 NOTE — PROGRESS NOTES
Video Education Report - ICR/CR  Name:  Jarad Barton     Date:  7/11/2024  MRN: [id number]     Session #:  5  Session Length: 40 min    Core Videos        []Heart Disease Risk Reduction       []Overview of Pritikin Eating Plan      [x]Move it          []Calorie Density         []Healthy Minds, Bodies, Hearts        []Label Reading - Part 1       []Metabolic Syndrome and Belly Fat        []How Our Thoughts Can Heal Our Hearts   []Dining Out - Part 1      []Biomechanical Limitations  []Facts on Fat        []Hypertension & Heart Disease    []Diseases of Our Time - Focusing on Diabetes   []Body Composition  []Nutrition Action Plan    []Exercise Action Plan    Exercise      Healthy Mind-Set  []Improving Performance    []Smoking Cessation    []Introduction to Yoga    Medical      Culinary  []Aging-Enhancing the Quality of Your Life  []Becoming a Pritikin   []Biology of Weight Control    []Cooking Breakfasts   []Decoding Lab Results    and Snacks  []Diseases of Our Time - Overview   []Cooking Dinner and   []Sleep Disorders     Sides  []Targeting Your Nutrition Priorities   []Healthy Salads &   Dressings  Nutrition      []Cooking Soups and   []Dining Out - Part 2     Desserts  []Fueling a Healthy Body   []Menu Workshop     Overview  []Planning Your Eating Strategy   []The Pritikin Solution  []Vitamins and Minerals    Comments:  Video completed, group discussion

## 2024-07-16 ENCOUNTER — HOSPITAL ENCOUNTER (OUTPATIENT)
Dept: CARDIAC REHAB | Age: 64
Setting detail: THERAPIES SERIES
Discharge: HOME | End: 2024-07-16
Payer: MEDICARE

## 2024-07-16 PROCEDURE — G0423 INTENS CARDIAC REHAB NO EXER: HCPCS

## 2024-07-16 PROCEDURE — G0422 INTENS CARDIAC REHAB W/EXERC: HCPCS

## 2024-07-16 NOTE — PROGRESS NOTES
Jarad WOODS.:  1960    Acct Number: [id number]   MRN:  [id number]                             [facility] NUTRITION WORKSHOP             Date: 2024        Session # _______    Today’s class covered:    []   Fueling a Healthy Body  []   Mindful Eating  []   Targeting Nutrition Priorities  [x]   Dining Out :  Making the Most of a Menu  []   Label Reading    Readiness to change:    []  Pre-contemplative   []  Contemplative - ambivalent about change    []  Action - ready to set action plan and implement   [x]  Maintenance - has made change and is trying, and or practicing different alternative         behaviors     Jarad was in the Workshop with the Dietitian for 50 minutes.  The content was presented via Powerpoint, lecture, and patient participation based format.  Motivational interviewing was utilized when needed, to promote change.  Patient voiced understanding.    Electronically signed by Willow Pratt RD on 2024 at 11:42 AM

## 2024-07-18 ENCOUNTER — HOSPITAL ENCOUNTER (OUTPATIENT)
Dept: CARDIAC REHAB | Age: 64
Setting detail: THERAPIES SERIES
Discharge: HOME | End: 2024-07-18
Payer: MEDICARE

## 2024-07-18 PROCEDURE — G0423 INTENS CARDIAC REHAB NO EXER: HCPCS

## 2024-07-18 PROCEDURE — G0422 INTENS CARDIAC REHAB W/EXERC: HCPCS

## 2024-07-18 NOTE — PLAN OF CARE
[facility] [facility] [place]-Based [place]  Individualized Cardiac Treatment Plan    [x] 72 Sessions   [] 36 Sessions  Patient Name:  Jarad Barton  :  1960  Age:  63 y.o.  MRN:  [id number]  Diagnosis: PCI  Date of Event: 24   Physician:  Dr. Edwards, Dr. Ward  Next Office Visit:  24  Date Entered Program: 24  Risk Stratifications: [x] Low [] Intermediate [] High  Allergies: No Known Allergies    [x][facility] Resource Folder & Patient Guidebook, given and explained to Jarad.    Individual Cardiac Treatment Plan -EXERCISE  INITIAL 30 DAY 60 DAY 90  DAY FINAL DAY   EXERCISE  ASSESSMENT/PLAN EXERCISE  REASSESSMENT EXERCISE   REASSESSMENT EXERCISE   REASSESSMENT EXERCISE   REASSESSMENT EXERCISE  DISCHARGE/FOLLOW-UP   Date: 24 Date: 2024 Date: Date: Date: Date:   Session #1   Total Session #14  First Exercise Session:  2024 Total Session #  Total Session #  Total Session #  Total Session #   Last Exercise Session:     Stages of Change Stages of Change Stages of Change Stages of Change Stages of Change Stages of Change   [] Pre Contemplation  [] Contemplation  [x] Preparation  [] Action  [] Maintenance  [] Relapse [] Pre Contemplation  [] Contemplation  [x] Preparation  [] Action  [] Maintenance  [] Relapse [] Pre Contemplation  [] Contemplation  [] Preparation  [] Action  [] Maintenance  [] Relapse [] Pre Contemplation  [] Contemplation  [] Preparation  [] Action  [] Maintenance  [] Relapse [] Pre Contemplation  [] Contemplation  [] Preparation  [] Action  [] Maintenance  [] Relapse [] Pre Contemplation  [] Contemplation  [] Preparation  [] Action  [] Maintenance  [] Relapse   EXERCISE ASSESSMENT EXERCISE ASSESSMENT EXERCISE ASSESSMENT EXERCISE ASSESSMENT EXERCISE ASSESSMENT EXERCISE ASSESSMENT   6 Min Walk Test  Distance walked:   0.21 miles  1108.8 ft.  2.6 METs  Max HR:82 BPM      RPE:  11    THR:  101-133  Rhythm:  S. Mohit     6 Min

## 2024-07-18 NOTE — PROGRESS NOTES
Video Education Report - ICR/CR  Name:  Jarad Barton     Date:  7/18/2024  MRN: [id number]     Session #:  7  Session Length: 40 min    Core Videos        []Heart Disease Risk Reduction       []Overview of Pritikin Eating Plan      []Move it          [x]Calorie Density         []Healthy Minds, Bodies, Hearts        []Label Reading - Part 1       []Metabolic Syndrome and Belly Fat        []How Our Thoughts Can Heal Our Hearts   []Dining Out - Part 1      []Biomechanical Limitations  []Facts on Fat        []Hypertension & Heart Disease    []Diseases of Our Time - Focusing on Diabetes   []Body Composition  []Nutrition Action Plan    []Exercise Action Plan        Comments:  Video completed, group discussion

## 2024-07-23 ENCOUNTER — HOSPITAL ENCOUNTER (OUTPATIENT)
Dept: CARDIAC REHAB | Age: 64
Setting detail: THERAPIES SERIES
Discharge: HOME | End: 2024-07-23
Payer: MEDICARE

## 2024-07-23 PROCEDURE — G0422 INTENS CARDIAC REHAB W/EXERC: HCPCS

## 2024-07-23 PROCEDURE — G0423 INTENS CARDIAC REHAB NO EXER: HCPCS

## 2024-07-23 NOTE — PROGRESS NOTES
Jarad WOODS.:  1960    Acct Number: [id number]   MRN:  [id number]                             [facility] HEALTHY MIND-SET WORKSHOP             Date: 2024        Session #________    Today’s class covered:    []  New Thoughts New Behaviors Workshop:  Patient will learn and practice techniques for developing effective health and lifestyle goals. Patient will be able to effectively apply the goal setting process learned to develop at least one new personal goal.     []  Managing Moods & Relationships Workshop:  Patient will learn how emotional and chronic stress factors can impact their hearts. They will learn healthy ways to handle stress and utilize positive coping mechanisms. In addition, [facility] patient will learn ways to improve communication skills.    [x]  Healthy Sleep for a healthy Heart:  Patients will learn the importance of sleep to overall health, well-being, and quality of life. They will understand the symptoms of, and treatments for, common sleep disorders. Patients will also be able to identify daytime and nighttime behaviors which impact sleep, and they will be able to apply these tools to help manage sleep-related challenges.     []  Recognizing and Reducing Stress:  Patients will learn about stress and how to recognize stress. Patients will gain insight into the toll that chronic stress takes on their health, both emotionally and physically.  Patients will learn and practice a variety of stress management techniques. Patients will be able to effectively apply coping mechanisms in perceived stressful situations.    Jarad actively participated and verbalized understanding.     Total time in the Healthy Mind-Set class was 60 minutes.    Electronically signed by MILENA Gonsales on 2024 at 2:35 PM

## 2024-07-25 ENCOUNTER — HOSPITAL ENCOUNTER (OUTPATIENT)
Dept: CARDIAC REHAB | Age: 64
Setting detail: THERAPIES SERIES
Discharge: HOME | End: 2024-07-25
Payer: MEDICARE

## 2024-07-25 PROCEDURE — G0422 INTENS CARDIAC REHAB W/EXERC: HCPCS

## 2024-07-25 PROCEDURE — G0423 INTENS CARDIAC REHAB NO EXER: HCPCS

## 2024-07-25 NOTE — PROGRESS NOTES
Video Education Report - ICR/CR  Name:  Jarad Barton     Date:  7/25/2024  MRN: [id number]     Session #:    Session Length: 40 min    Core Videos        []Heart Disease Risk Reduction       []Overview of Pritikin Eating Plan      []Move it          []Calorie Density         []Healthy Minds, Bodies, Hearts        [x]Label Reading - Part 1       []Metabolic Syndrome and Belly Fat        []How Our Thoughts Can Heal Our Hearts   []Dining Out - Part 1      []Biomechanical Limitations  []Facts on Fat        []Hypertension & Heart Disease    []Diseases of Our Time - Focusing on Diabetes   []Body Composition  []Nutrition Action Plan    []Exercise Action Plan    Exercise      Healthy Mind-Set  []Improving Performance    []Smoking Cessation    []Introduction to Yoga    Medical      Culinary  []Aging-Enhancing the Quality of Your Life  []Becoming a Pritikin   []Biology of Weight Control    []Cooking Breakfasts   []Decoding Lab Results    and Snacks  []Diseases of Our Time - Overview   []Cooking Dinner and   []Sleep Disorders     Sides  []Targeting Your Nutrition Priorities   []Healthy Salads &   Dressings  Nutrition      []Cooking Soups and   []Dining Out - Part 2     Desserts  []Fueling a Healthy Body   []Menu Workshop     Overview  []Planning Your Eating Strategy   []The Pritikin Solution  []Vitamins and Minerals    Comments:  Video completed, group discussion

## 2024-07-31 ENCOUNTER — HOSPITAL ENCOUNTER (OUTPATIENT)
Dept: CARDIAC REHAB | Age: 64
Setting detail: THERAPIES SERIES
Discharge: HOME | End: 2024-07-31
Payer: MEDICARE

## 2024-07-31 PROCEDURE — G0422 INTENS CARDIAC REHAB W/EXERC: HCPCS

## 2024-07-31 PROCEDURE — G0423 INTENS CARDIAC REHAB NO EXER: HCPCS

## 2024-07-31 NOTE — PROGRESS NOTES
Video Education Report - ICR/CR  Name:  Jarad Barton     Date:  7/31/2024  MRN: [id number]     Session #:    Session Length: 45 min    Core Videos        []Heart Disease Risk Reduction       []Overview of Pritikin Eating Plan      []Move it          []Calorie Density         []Healthy Minds, Bodies, Hearts        []Label Reading - Part 1       []Metabolic Syndrome and Belly Fat        []How Our Thoughts Can Heal Our Hearts   []Dining Out - Part 1      []Biomechanical Limitations  []Facts on Fat        []Hypertension & Heart Disease    []Diseases of Our Time - Focusing on Diabetes   []Body Composition  []Nutrition Action Plan    []Exercise Action Plan    Exercise      Healthy Mind-Set  []Improving Performance    []Smoking Cessation    []Introduction to Yoga    Medical      Culinary  []Aging-Enhancing the Quality of Your Life  []Becoming a Pritikin   [x]Biology of Weight Control    []Cooking Breakfasts   []Decoding Lab Results    and Snacks  []Diseases of Our Time - Overview   []Cooking Dinner and   []Sleep Disorders     Sides  []Targeting Your Nutrition Priorities   []Healthy Salads &   Dressings  Nutrition      []Cooking Soups and   []Dining Out - Part 2     Desserts  []Fueling a Healthy Body   []Menu Workshop     Overview  []Planning Your Eating Strategy   []The Pritikin Solution  []Vitamins and Minerals    Comments:  Video completed, group discussion

## 2024-08-01 ENCOUNTER — HOSPITAL ENCOUNTER (OUTPATIENT)
Dept: CARDIAC REHAB | Age: 64
Setting detail: THERAPIES SERIES
Discharge: HOME | End: 2024-08-01
Payer: MEDICARE

## 2024-08-01 ENCOUNTER — APPOINTMENT (OUTPATIENT)
Dept: CARDIAC REHAB | Age: 64
End: 2024-08-01
Payer: MEDICARE

## 2024-08-01 PROCEDURE — G0422 INTENS CARDIAC REHAB W/EXERC: HCPCS

## 2024-08-06 ENCOUNTER — HOSPITAL ENCOUNTER (OUTPATIENT)
Dept: CARDIAC REHAB | Age: 64
Setting detail: THERAPIES SERIES
Discharge: HOME | End: 2024-08-06
Payer: MEDICARE

## 2024-08-06 PROCEDURE — G0422 INTENS CARDIAC REHAB W/EXERC: HCPCS

## 2024-08-06 PROCEDURE — G0423 INTENS CARDIAC REHAB NO EXER: HCPCS

## 2024-08-06 RX ORDER — ROSUVASTATIN CALCIUM 20 MG/1
20 TABLET, COATED ORAL NIGHTLY
Qty: 90 TABLET | Refills: 1 | Status: SHIPPED | OUTPATIENT
Start: 2024-08-06

## 2024-08-06 NOTE — PROGRESS NOTES
Jarad Barton YOB: 1960    Acct Number: [id number]   MRN:  [id number]                             [facility] NUTRITION WORKSHOP             Date: 2024        Session # _______    Today’s class covered:    []   Fueling a Healthy Body  []   Mindful Eating  []   Targeting Nutrition Priorities  []   Dining Out :  Making the Most of a Menu  [x]   Label Reading    Readiness to change:    []  Pre-contemplative   []  Contemplative - ambivalent about change    []  Action - ready to set action plan and implement   [x]  Maintenance - has made change and is trying, and or practicing different alternative         behaviors     Jarad was in the Workshop with the Dietitian for 55 minutes.  The content was presented via Powerpoint, lecture, and patient participation based format.  Motivational interviewing was utilized when needed, to promote change.  Patient voiced understanding.    Electronically signed by Willow Pratt RD on 2024 at 11:55 AM

## 2024-08-06 NOTE — TELEPHONE ENCOUNTER
Patient is needing new scripts sent to the local [facility] pharmacy for him, since [facility] has closed.    Jarad Barton called requesting a refill on the following medications:  Requested Prescriptions     Pending Prescriptions Disp Refills    ticagrelor (BRILINTA) 90 MG TABS tablet 60 tablet 6     Sig: Take 1 tablet by mouth 2 times daily    metoprolol tartrate (LOPRESSOR) 25 MG tablet 60 tablet      Sig: Take 0.5 tablets by mouth 2 times daily    rosuvastatin (CRESTOR) 20 MG tablet 30 tablet 3     Sig: Take 1 tablet by mouth nightly     Pharmacy verified:  [email or web address]  [facility] [address] [place] [address] -  [phone] -  [phone]      Date of last visit: 06/25/2024  Date of next visit (if applicable): 12/24/2024

## 2024-08-08 ENCOUNTER — HOSPITAL ENCOUNTER (OUTPATIENT)
Dept: CARDIAC REHAB | Age: 64
Setting detail: THERAPIES SERIES
Discharge: HOME | End: 2024-08-08
Payer: MEDICARE

## 2024-08-08 PROCEDURE — G0423 INTENS CARDIAC REHAB NO EXER: HCPCS

## 2024-08-08 PROCEDURE — G0422 INTENS CARDIAC REHAB W/EXERC: HCPCS

## 2024-08-08 NOTE — PROGRESS NOTES
Video Education Report - ICR/CR  Name:  Jarad Barton     Date:  8/8/2024  MRN: [id number]     Session #:    Session Length: 40 min    Core Videos        []Heart Disease Risk Reduction       []Overview of Pritikin Eating Plan      [x]Move it          []Calorie Density         []Healthy Minds, Bodies, Hearts        []Label Reading - Part 1       []Metabolic Syndrome and Belly Fat        []How Our Thoughts Can Heal Our Hearts   []Dining Out - Part 1      []Biomechanical Limitations  []Facts on Fat        []Hypertension & Heart Disease    []Diseases of Our Time - Focusing on Diabetes   []Body Composition  []Nutrition Action Plan    []Exercise Action Plan    Comments:  Video completed, group discussion

## 2024-08-13 ENCOUNTER — HOSPITAL ENCOUNTER (OUTPATIENT)
Dept: CARDIAC REHAB | Age: 64
Setting detail: THERAPIES SERIES
Discharge: HOME | End: 2024-08-13
Payer: MEDICARE

## 2024-08-13 PROCEDURE — G0422 INTENS CARDIAC REHAB W/EXERC: HCPCS

## 2024-08-13 PROCEDURE — G0423 INTENS CARDIAC REHAB NO EXER: HCPCS

## 2024-08-13 NOTE — PROGRESS NOTES
Jarad WOODS.:  1960    Acct Number: [id number]   MRN:  [id number]                             [facility] HEALTHY MIND-SET WORKSHOP             Date: 2024        Session #________    Today’s class covered:    []  New Thoughts New Behaviors Workshop:  Patient will learn and practice techniques for developing effective health and lifestyle goals. Patient will be able to effectively apply the goal setting process learned to develop at least one new personal goal.     []  Managing Moods & Relationships Workshop:  Patient will learn how emotional and chronic stress factors can impact their hearts. They will learn healthy ways to handle stress and utilize positive coping mechanisms. In addition, [facility] patient will learn ways to improve communication skills.    []  Healthy Sleep for a healthy Heart:  Patients will learn the importance of sleep to overall health, well-being, and quality of life. They will understand the symptoms of, and treatments for, common sleep disorders. Patients will also be able to identify daytime and nighttime behaviors which impact sleep, and they will be able to apply these tools to help manage sleep-related challenges.     [x]  Recognizing and Reducing Stress:  Patients will learn about stress and how to recognize stress. Patients will gain insight into the toll that chronic stress takes on their health, both emotionally and physically.  Patients will learn and practice a variety of stress management techniques. Patients will be able to effectively apply coping mechanisms in perceived stressful situations.    Jarad actively participated and verbalized understanding.     Total time in the Healthy Mind-Set class was 60 minutes.    Electronically signed by MILENA Gonsales on 2024 at 10:58 AM

## 2024-08-15 ENCOUNTER — HOSPITAL ENCOUNTER (OUTPATIENT)
Dept: CARDIAC REHAB | Age: 64
Setting detail: THERAPIES SERIES
Discharge: HOME | End: 2024-08-15
Payer: MEDICARE

## 2024-08-15 PROCEDURE — G0423 INTENS CARDIAC REHAB NO EXER: HCPCS

## 2024-08-15 PROCEDURE — G0422 INTENS CARDIAC REHAB W/EXERC: HCPCS

## 2024-08-15 NOTE — PROGRESS NOTES
Video Education Report - ICR/CR  Name:  Jarad Barton     Date:  8/15/2024  MRN: [id number]     Session #:  14  Session Length: 40 min    Core Videos        []Heart Disease Risk Reduction       []Overview of Pritikin Eating Plan      []Move it          []Calorie Density         []Healthy Minds, Bodies, Hearts        []Label Reading - Part 1       []Metabolic Syndrome and Belly Fat        []How Our Thoughts Can Heal Our Hearts   []Dining Out - Part 1      []Biomechanical Limitations  []Facts on Fat        [x]Hypertension & Heart Disease    []Diseases of Our Time - Focusing on Diabetes   []Body Composition  []Nutrition Action Plan    []Exercise Action Plan      Comments:  Video completed, group discussion

## 2024-08-15 NOTE — PLAN OF CARE
[facility] Facility-Based Program  Individualized Cardiac Treatment Plan    [x] 72 Sessions   [] 36 Sessions  Patient Name:  Jarad Barton  :  1960  Age:  63 y.o.  MRN:  [id number]  Diagnosis: PCI  Date of Event: 24   Physician:  Dr. Edwards, Dr. Ward  Next Office Visit:  24  Date Entered Program: 24  Risk Stratifications: [x] Low [] Intermediate [] High  Allergies: No Known Allergies    [x][facility] Resource Folder & Patient Guidebook, given and explained to Jarad.    Individual Cardiac Treatment Plan -EXERCISE  INITIAL 30 DAY 60 DAY 90  DAY FINAL DAY   EXERCISE  ASSESSMENT/PLAN EXERCISE  REASSESSMENT EXERCISE   REASSESSMENT EXERCISE   REASSESSMENT EXERCISE   REASSESSMENT EXERCISE  DISCHARGE/FOLLOW-UP   Date: 24 Date: 2024 Date: 8/15/24 Date: Date: Date:   Session #1   Total Session #14  First Exercise Session:  2024 Total Session # 28 Total Session #  Total Session #  Total Session #   Last Exercise Session:     Stages of Change Stages of Change Stages of Change Stages of Change Stages of Change Stages of Change   [] Pre Contemplation  [] Contemplation  [x] Preparation  [] Action  [] Maintenance  [] Relapse [] Pre Contemplation  [] Contemplation  [x] Preparation  [] Action  [] Maintenance  [] Relapse [] Pre Contemplation  [] Contemplation  [] Preparation  [x] Action  [] Maintenance  [] Relapse [] Pre Contemplation  [] Contemplation  [] Preparation  [] Action  [] Maintenance  [] Relapse [] Pre Contemplation  [] Contemplation  [] Preparation  [] Action  [] Maintenance  [] Relapse [] Pre Contemplation  [] Contemplation  [] Preparation  [] Action  [] Maintenance  [] Relapse   EXERCISE ASSESSMENT EXERCISE ASSESSMENT EXERCISE ASSESSMENT EXERCISE ASSESSMENT EXERCISE ASSESSMENT EXERCISE ASSESSMENT   6 Min Walk Test  Distance walked:   0.21 miles  1108.8 ft.  2.6 METs  Max HR:82 BPM      RPE:  11    THR:  101-133  Rhythm:  S. Mohit

## 2024-08-19 ENCOUNTER — HOSPITAL ENCOUNTER (OUTPATIENT)
Dept: CARDIAC REHAB | Age: 64
Setting detail: THERAPIES SERIES
Discharge: HOME | End: 2024-08-19
Payer: MEDICARE

## 2024-08-19 PROCEDURE — G0422 INTENS CARDIAC REHAB W/EXERC: HCPCS

## 2024-08-19 PROCEDURE — G0423 INTENS CARDIAC REHAB NO EXER: HCPCS

## 2024-08-19 NOTE — PROGRESS NOTES
Video Education Report - ICR/CR  Name:  Jarad Barton     Date:  8/19/2024  MRN: [id number]     Session #:  15  Session Length: 40 min    Core Videos        []Heart Disease Risk Reduction       []Overview of Pritikin Eating Plan      []Move it          []Calorie Density         [x]Healthy Minds, Bodies, Hearts        []Label Reading - Part 1       []Metabolic Syndrome and Belly Fat        []How Our Thoughts Can Heal Our Hearts   []Dining Out - Part 1      []Biomechanical Limitations  []Facts on Fat        []Hypertension & Heart Disease    []Diseases of Our Time - Focusing on Diabetes   []Body Composition  []Nutrition Action Plan    []Exercise Action Plan      Comments:  Video completed, group discussion

## 2024-08-20 ENCOUNTER — APPOINTMENT (OUTPATIENT)
Dept: CARDIAC REHAB | Age: 64
End: 2024-08-20
Payer: MEDICARE

## 2024-08-20 ENCOUNTER — HOSPITAL ENCOUNTER (OUTPATIENT)
Dept: CARDIAC REHAB | Age: 64
Setting detail: THERAPIES SERIES
End: 2024-08-20
Payer: MEDICARE

## 2024-08-21 ENCOUNTER — HOSPITAL ENCOUNTER (OUTPATIENT)
Dept: CARDIAC REHAB | Age: 64
Setting detail: THERAPIES SERIES
Discharge: HOME | End: 2024-08-21
Payer: MEDICARE

## 2024-08-21 PROCEDURE — G0423 INTENS CARDIAC REHAB NO EXER: HCPCS

## 2024-08-21 PROCEDURE — G0422 INTENS CARDIAC REHAB W/EXERC: HCPCS

## 2024-08-21 NOTE — PROGRESS NOTES
Video Education   Report - ICR/CR  Name:  Jarad Barton     Date:  8/21/2024  MRN: [id number]     Session #:  16  Session Length: 40 min    Core Videos        []Heart Disease Risk Reduction       []Overview of Pritikin Eating Plan      []Move it          []Calorie Density         []Healthy Minds, Bodies, Hearts        []Label Reading - Part 1       []Metabolic Syndrome and Belly Fat        []How Our Thoughts Can Heal Our Hearts   [x]Dining Out - Part 1      []Biomechanical Limitations  []Facts on Fat        []Hypertension & Heart Disease    []Diseases of Our Time - Focusing on Diabetes   []Body Composition  []Nutrition Action Plan    []Exercise Action Plan  Comments:  Video completed, group discussion

## 2024-08-27 ENCOUNTER — HOSPITAL ENCOUNTER (OUTPATIENT)
Dept: CARDIAC REHAB | Age: 64
Setting detail: THERAPIES SERIES
Discharge: HOME | End: 2024-08-27
Payer: MEDICARE

## 2024-08-27 PROCEDURE — G0422 INTENS CARDIAC REHAB W/EXERC: HCPCS

## 2024-08-27 PROCEDURE — G0423 INTENS CARDIAC REHAB NO EXER: HCPCS

## 2024-08-27 NOTE — PROGRESS NOTES
Jarad WOODS.:  1960    Acct Number: [id number]   MRN:  [id number]                             [facility] NUTRITION WORKSHOP             Date: 2024        Session # _______    Today’s class covered:    [x]   Fueling a Healthy Body  []   Mindful Eating  []   Targeting Nutrition Priorities  []   Dining Out :  Making the Most of a Menu  []   Label Reading    Readiness to change:    []  Pre-contemplative   []  Contemplative - ambivalent about change    []  Action - ready to set action plan and implement   []  Maintenance - has made change and is trying, and or practicing different alternative         behaviors     Jarad was in the Workshop with the Dietitian for 45 minutes.  The content was presented via Powerpoint, lecture, and patient participation based format.  Motivational interviewing was utilized when needed, to promote change.  Patient voiced understanding.    Electronically signed by Willow Pratt RD on 2024 at 11:55 AM

## 2024-08-29 ENCOUNTER — HOSPITAL ENCOUNTER (OUTPATIENT)
Dept: CARDIAC REHAB | Age: 64
Setting detail: THERAPIES SERIES
Discharge: HOME | End: 2024-08-29
Payer: MEDICARE

## 2024-08-29 PROCEDURE — G0423 INTENS CARDIAC REHAB NO EXER: HCPCS

## 2024-08-29 PROCEDURE — G0422 INTENS CARDIAC REHAB W/EXERC: HCPCS

## 2024-08-29 NOTE — PROGRESS NOTES
Video Education Report - ICR/CR  Name:  Jarad Barton     Date:  8/29/2024  MRN: [id number]     Session #:    Session Length: 45 min    Core Videos        []Heart Disease Risk Reduction       []Overview of Pritikin Eating Plan      []Move it          []Calorie Density         []Healthy Minds, Bodies, Hearts        []Label Reading - Part 1       []Metabolic Syndrome and Belly Fat        [x]How Our Thoughts Can Heal Our Hearts   []Dining Out - Part 1      []Biomechanical Limitations  []Facts on Fat        []Hypertension & Heart Disease    []Diseases of Our Time - Focusing on Diabetes   []Body Composition  []Nutrition Action Plan    []Exercise Action Plan    Exercise      Healthy Mind-Set  []Improving Performance    []Smoking Cessation    []Introduction to Yoga    Medical      Culinary  []Aging-Enhancing the Quality of Your Life  []Becoming a Pritikin   []Biology of Weight Control    []Cooking Breakfasts   []Decoding Lab Results    and Snacks  []Diseases of Our Time - Overview   []Cooking Dinner and   []Sleep Disorders     Sides  []Targeting Your Nutrition Priorities   []Healthy Salads &   Dressings  Nutrition      []Cooking Soups and   []Dining Out - Part 2     Desserts  []Fueling a Healthy Body   []Menu Workshop     Overview  []Planning Your Eating Strategy   []The Pritikin Solution  []Vitamins and Minerals    Comments:  Video completed, group discussion

## 2024-09-03 ENCOUNTER — HOSPITAL ENCOUNTER (OUTPATIENT)
Dept: CARDIAC REHAB | Age: 64
Setting detail: THERAPIES SERIES
Discharge: HOME | End: 2024-09-03
Payer: MEDICARE

## 2024-09-03 PROCEDURE — G0422 INTENS CARDIAC REHAB W/EXERC: HCPCS

## 2024-09-03 PROCEDURE — G0423 INTENS CARDIAC REHAB NO EXER: HCPCS

## 2024-09-03 NOTE — PROGRESS NOTES
Jarad WOODS.:  1960    Acct Number: [id number]   MRN:  [id number]                             [facility] HEALTHY MIND-SET WORKSHOP             Date: 9/3/2024        Session #________    Today’s class covered:    [x]  New Thoughts New Behaviors Workshop:  Patient will learn and practice techniques for developing effective health and lifestyle goals. Patient will be able to effectively apply the goal setting process learned to develop at least one new personal goal.     []  Managing Moods & Relationships Workshop:  Patient will learn how emotional and chronic stress factors can impact their hearts. They will learn healthy ways to handle stress and utilize positive coping mechanisms. In addition, [facility] patient will learn ways to improve communication skills.    []  Healthy Sleep for a healthy Heart:  Patients will learn the importance of sleep to overall health, well-being, and quality of life. They will understand the symptoms of, and treatments for, common sleep disorders. Patients will also be able to identify daytime and nighttime behaviors which impact sleep, and they will be able to apply these tools to help manage sleep-related challenges.     []  Recognizing and Reducing Stress:  Patients will learn about stress and how to recognize stress. Patients will gain insight into the toll that chronic stress takes on their health, both emotionally and physically.  Patients will learn and practice a variety of stress management techniques. Patients will be able to effectively apply coping mechanisms in perceived stressful situations.    Jarad actively participated and verbalized understanding.     Total time in the Healthy Mind-Set class was 60 minutes.    Electronically signed by MILENA Gonsales on 9/3/2024 at 12:56 PM

## 2024-09-05 ENCOUNTER — HOSPITAL ENCOUNTER (OUTPATIENT)
Dept: CARDIAC REHAB | Age: 64
Setting detail: THERAPIES SERIES
Discharge: HOME | End: 2024-09-05
Payer: MEDICARE

## 2024-09-05 PROCEDURE — G0422 INTENS CARDIAC REHAB W/EXERC: HCPCS

## 2024-09-05 PROCEDURE — G0423 INTENS CARDIAC REHAB NO EXER: HCPCS

## 2024-09-05 NOTE — PROGRESS NOTES
Video Education Report - ICR/CR  Name:  Jarad Barton     Date:  9/5/2024  MRN: [id number]     Session #:  20  Session Length: 45 min    Core Videos        []Heart Disease Risk Reduction       []Overview of Pritikin Eating Plan      []Move it          []Calorie Density         []Healthy Minds, Bodies, Hearts        []Label Reading - Part 1       []Metabolic Syndrome and Belly Fat        []How Our Thoughts Can Heal Our Hearts   []Dining Out - Part 1      [x]Biomechanical Limitations  []Facts on Fat        []Hypertension & Heart Disease    []Diseases of Our Time - Focusing on Diabetes   []Body Composition  []Nutrition Action Plan    []Exercise Action Plan    Exercise      Healthy Mind-Set  []Improving Performance    []Smoking Cessation    []Introduction to Yoga    Medical      Culinary  []Aging-Enhancing the Quality of Your Life  []Becoming a Pritikin   []Biology of Weight Control    []Cooking Breakfasts   []Decoding Lab Results    and Snacks  []Diseases of Our Time - Overview   []Cooking Dinner and   []Sleep Disorders     Sides  []Targeting Your Nutrition Priorities   []Healthy Salads &   Dressings  Nutrition      []Cooking Soups and   []Dining Out - Part 2     Desserts  []Fueling a Healthy Body   []Menu Workshop     Overview  []Planning Your Eating Strategy   []The Pritikin Solution  []Vitamins and Minerals    Comments:  Video completed, group discussion

## 2024-09-10 ENCOUNTER — APPOINTMENT (OUTPATIENT)
Dept: CARDIAC REHAB | Age: 64
End: 2024-09-10
Payer: MEDICARE

## 2024-09-12 ENCOUNTER — HOSPITAL ENCOUNTER (OUTPATIENT)
Dept: CARDIAC REHAB | Age: 64
Setting detail: THERAPIES SERIES
Discharge: HOME | End: 2024-09-12
Payer: MEDICARE

## 2024-09-12 PROCEDURE — G0423 INTENS CARDIAC REHAB NO EXER: HCPCS

## 2024-09-12 PROCEDURE — G0422 INTENS CARDIAC REHAB W/EXERC: HCPCS

## 2024-09-17 ENCOUNTER — HOSPITAL ENCOUNTER (OUTPATIENT)
Dept: CARDIAC REHAB | Age: 64
Setting detail: THERAPIES SERIES
Discharge: HOME | End: 2024-09-17
Payer: MEDICARE

## 2024-09-17 PROCEDURE — G0422 INTENS CARDIAC REHAB W/EXERC: HCPCS

## 2024-09-17 PROCEDURE — G0423 INTENS CARDIAC REHAB NO EXER: HCPCS

## 2024-09-19 ENCOUNTER — HOSPITAL ENCOUNTER (OUTPATIENT)
Dept: CARDIAC REHAB | Age: 64
Setting detail: THERAPIES SERIES
Discharge: HOME | End: 2024-09-19
Payer: MEDICARE

## 2024-09-19 PROCEDURE — G0423 INTENS CARDIAC REHAB NO EXER: HCPCS

## 2024-09-19 PROCEDURE — G0422 INTENS CARDIAC REHAB W/EXERC: HCPCS

## 2024-09-24 ENCOUNTER — HOSPITAL ENCOUNTER (OUTPATIENT)
Dept: CARDIAC REHAB | Age: 64
Setting detail: THERAPIES SERIES
Discharge: HOME | End: 2024-09-24
Payer: MEDICARE

## 2024-09-24 PROCEDURE — G0423 INTENS CARDIAC REHAB NO EXER: HCPCS

## 2024-09-24 PROCEDURE — G0422 INTENS CARDIAC REHAB W/EXERC: HCPCS

## 2024-09-26 ENCOUNTER — HOSPITAL ENCOUNTER (OUTPATIENT)
Dept: CARDIAC REHAB | Age: 64
Setting detail: THERAPIES SERIES
Discharge: HOME | End: 2024-09-26
Payer: MEDICARE

## 2024-09-26 PROCEDURE — G0423 INTENS CARDIAC REHAB NO EXER: HCPCS

## 2024-09-26 PROCEDURE — G0422 INTENS CARDIAC REHAB W/EXERC: HCPCS

## 2024-10-01 ENCOUNTER — HOSPITAL ENCOUNTER (OUTPATIENT)
Dept: CARDIAC REHAB | Age: 64
Setting detail: THERAPIES SERIES
Discharge: HOME | End: 2024-10-01
Payer: MEDICARE

## 2024-10-01 PROCEDURE — G0423 INTENS CARDIAC REHAB NO EXER: HCPCS

## 2024-10-01 PROCEDURE — G0422 INTENS CARDIAC REHAB W/EXERC: HCPCS

## 2024-10-01 NOTE — PROGRESS NOTES
Video Education Report - ICR/CR  Name:  Jarad Barton     Date:  10/1/2024  MRN: [id number]     Session #:    Session Length: 45 min    Core Videos        []Heart Disease Risk Reduction       []Overview of Pritikin Eating Plan      []Move it          []Calorie Density         []Healthy Minds, Bodies, Hearts        []Label Reading - Part 1       []Metabolic Syndrome and Belly Fat        []How Our Thoughts Can Heal Our Hearts   []Dining Out - Part 1      []Biomechanical Limitations  []Facts on Fat        []Hypertension & Heart Disease    []Diseases of Our Time - Focusing on Diabetes   []Body Composition  []Nutrition Action Plan    []Exercise Action Plan    Exercise      Healthy Mind-Set  []Improving Performance    []Smoking Cessation    []Introduction to Yoga    Medical      Culinary  []Aging-Enhancing the Quality of Your Life  []Becoming a Pritikin   []Biology of Weight Control    []Cooking Breakfasts   [x]Decoding Lab Results    and Snacks  []Diseases of Our Time - Overview   []Cooking Dinner and   []Sleep Disorders     Sides  []Targeting Your Nutrition Priorities   []Healthy Salads &   Dressings  Nutrition      []Cooking Soups and   []Dining Out - Part 2     Desserts  []Fueling a Healthy Body   []Menu Workshop     Overview  []Planning Your Eating Strategy   []The Pritikin Solution  []Vitamins and Minerals    Comments:  Video completed, group discussion

## 2024-10-03 ENCOUNTER — HOSPITAL ENCOUNTER (OUTPATIENT)
Dept: CARDIAC REHAB | Age: 64
Setting detail: THERAPIES SERIES
Discharge: HOME | End: 2024-10-03
Payer: MEDICARE

## 2024-10-03 PROCEDURE — G0423 INTENS CARDIAC REHAB NO EXER: HCPCS

## 2024-10-03 PROCEDURE — G0422 INTENS CARDIAC REHAB W/EXERC: HCPCS

## 2024-10-03 NOTE — PROGRESS NOTES
Video Education Report - ICR/CR  Name:  Jarad Barton     Date:  10/3/2024  MRN: [id number]     Session #:    Session Length: 45   min    Core Videos        []Heart Disease Risk Reduction       []Overview of Pritikin Eating Plan      []Move it          []Calorie Density         []Healthy Minds, Bodies, Hearts        []Label Reading - Part 1       []Metabolic Syndrome and Belly Fat        []How Our Thoughts Can Heal Our Hearts   []Dining Out - Part 1      []Biomechanical Limitations  []Facts on Fat        []Hypertension & Heart Disease    []Diseases of Our Time - Focusing on Diabetes   []Body Composition  []Nutrition Action Plan    [x]Exercise Action Plan    Exercise      Healthy Mind-Set  []Improving Performance    []Smoking Cessation    []Introduction to Yoga    Medical      Culinary  []Aging-Enhancing the Quality of Your Life  []Becoming a Pritikin   []Biology of Weight Control    []Cooking Breakfasts   []Decoding Lab Results    and Snacks  []Diseases of Our Time - Overview   []Cooking Dinner and   []Sleep Disorders     Sides  []Targeting Your Nutrition Priorities   []Healthy Salads &   Dressings  Nutrition      []Cooking Soups and   []Dining Out - Part 2     Desserts  []Fueling a Healthy Body   []Menu Workshop     Overview  []Planning Your Eating Strategy   []The Pritikin Solution  []Vitamins and Minerals    Comments:  Video completed, group discussion

## 2024-10-08 ENCOUNTER — HOSPITAL ENCOUNTER (OUTPATIENT)
Dept: CARDIAC REHAB | Age: 64
Setting detail: THERAPIES SERIES
Discharge: HOME | End: 2024-10-08
Payer: MEDICARE

## 2024-10-08 PROCEDURE — G0423 INTENS CARDIAC REHAB NO EXER: HCPCS

## 2024-10-08 PROCEDURE — G0422 INTENS CARDIAC REHAB W/EXERC: HCPCS

## 2024-10-08 NOTE — PROGRESS NOTES
Video Education Report - ICR/CR  Name:  Jarad Barton     Date:  10/8/2024  MRN: [id number]     Session #:    Session Length: 40 min    Core Videos        []Heart Disease Risk Reduction       []Overview of Pritikin Eating Plan      []Move it          []Calorie Density         []Healthy Minds, Bodies, Hearts        []Label Reading - Part 1       [x]Metabolic Syndrome and Belly Fat        []How Our Thoughts Can Heal Our Hearts   []Dining Out - Part 1      []Biomechanical Limitations  []Facts on Fat        []Hypertension & Heart Disease    []Diseases of Our Time - Focusing on Diabetes   []Body Composition  []Nutrition Action Plan    []Exercise Action Plan        Comments:  Video completed, group discussion

## 2024-10-10 ENCOUNTER — HOSPITAL ENCOUNTER (OUTPATIENT)
Dept: CARDIAC REHAB | Age: 64
Setting detail: THERAPIES SERIES
Discharge: HOME | End: 2024-10-10
Payer: MEDICARE

## 2024-10-10 PROCEDURE — G0423 INTENS CARDIAC REHAB NO EXER: HCPCS

## 2024-10-10 PROCEDURE — G0422 INTENS CARDIAC REHAB W/EXERC: HCPCS

## 2024-10-10 NOTE — PROGRESS NOTES
Video Education Report - ICR/CR  Name:  Jarad Barton     Date:  10/10/2024  MRN: [id number]     Session #:  29  Session Length: 40 min    Core Videos        []Heart Disease Risk Reduction       []Overview of Pritikin Eating Plan      []Move it          []Calorie Density         []Healthy Minds, Bodies, Hearts        []Label Reading - Part 1       []Metabolic Syndrome and Belly Fat        []How Our Thoughts Can Heal Our Hearts   []Dining Out - Part 1      []Biomechanical Limitations  []Facts on Fat        []Hypertension & Heart Disease    []Diseases of Our Time - Focusing on Diabetes   []Body Composition  [x]Nutrition Action Plan    []Exercise Action Plan    Comments:  Video completed, group discussion

## 2024-10-10 NOTE — PLAN OF CARE
No  Upper and Lower body strength training 2x/wk    Wt: 5#       Reps:  8-15    *Increase wt. after completing 15 reps with an RPE of <12/13. Continue Strength Training at home   [] Exercise Log & Strength training handout given     Wt: #       Reps:  8-15    *Increase wt. after completing 15 reps with an RPE of <12/13.   Flexibility Flexibility Flexibility Flexibility Flexibility Flexibility   [x] Yes      [] No  25 min session of Core Strength & Flexibility 1x/per week  Attends Core Strength & Flexibility   [x] Yes      [] No Attends Core Strength & Flexibility   [x] Yes      [] No Attends Core Strength & Flexibility   [x] Yes      [] No Attends Core Strength & Flexibility   [x] Yes      [] No Continue Core Strength & Flexibility at home   Home Exercise  *Jarad verbalizes planning to initiate home exercise on days not in rehab. Home Exercise  *Jarad plans to walk/stretches 3 days/week for 15 min on days not in rehab. Home Exercise  *Jarad plans to walk 3-4 days/week for 20-30 min on days not in rehab. Home Exercise  *Jarad plans to walk 3-4  days/week for 20-30 min on days not in rehab. Home Exercise  *Jarad plans to walk 3-4 days/week for 20-30 min on days not in rehab. Home Exercise  *Jarad plans to    *Education* *Education* *Education* *Education* *Education* *Education*   RPE Scale  [x] Yes      [] No  Exercise Safety  [x] Yes      [] No  Equipment Orientation  [x] Yes      [] No  S/S to Report  [x] Yes      [] No  Warm Up/Cool Down  [x] Yes      [] No  Home Exercise  [x] Yes      [] No     All Exercise Education Completed  [] Yes      [] No   Exercise Education Recommended    Workshops  [x] Exercise Basics  [x] Balance Training & Fall Prevention  [x] Managing Heart Disease   [x] Yoga & Heart Health Exercise Education Attended/Date Exercise Education Attended/Date    NA Exercise Education Attended/Date    N/A Exercise Education Attended/Date    N/A All Sessions Completed?    [] Yes  [] No    If

## 2024-10-15 ENCOUNTER — HOSPITAL ENCOUNTER (OUTPATIENT)
Dept: CARDIAC REHAB | Age: 64
Setting detail: THERAPIES SERIES
Discharge: HOME | End: 2024-10-15
Payer: MEDICARE

## 2024-10-15 PROCEDURE — G0422 INTENS CARDIAC REHAB W/EXERC: HCPCS

## 2024-10-15 PROCEDURE — G0423 INTENS CARDIAC REHAB NO EXER: HCPCS

## 2024-10-15 NOTE — PROGRESS NOTES
Video Education Report - ICR/CR  Name:  Jarad Barton     Date:  10/15/2024  MRN: [id number]     Session #:  30  Session Length: 40 min      Exercise      Healthy Mind-Set  []Improving Performance    []Smoking Cessation    []Introduction to Yoga    Medical      Culinary  []Aging-Enhancing the Quality of Your Life  []Becoming a Pritikin   []Biology of Weight Control    []Cooking Breakfasts   []Decoding Lab Results    and Snacks  []Diseases of Our Time - Overview   []Cooking Dinner and   []Sleep Disorders     Sides  []Targeting Your Nutrition Priorities   []Healthy Salads &   Dressings  Nutrition      []Cooking Soups and   [x]Dining Out - Part 2     Desserts  []Fueling a Healthy Body   []Menu Workshop     Overview  []Planning Your Eating Strategy   []The Pritikin Solution  []Vitamins and Minerals    Comments:  Video completed, group discussion

## 2024-10-17 ENCOUNTER — HOSPITAL ENCOUNTER (OUTPATIENT)
Dept: CARDIAC REHAB | Age: 64
Setting detail: THERAPIES SERIES
Discharge: HOME | End: 2024-10-17
Payer: MEDICARE

## 2024-10-17 PROCEDURE — G0422 INTENS CARDIAC REHAB W/EXERC: HCPCS

## 2024-10-17 PROCEDURE — G0423 INTENS CARDIAC REHAB NO EXER: HCPCS

## 2024-10-17 NOTE — PROGRESS NOTES
Video Education Report - ICR/CR  Name:  Jarad Barton     Date:  10/17/2024  MRN: [id number]     Session #:  31  Session Length: 40 min    Exercise      Healthy Mind-Set  [x]Improving Performance    []Smoking Cessation    []Introduction to Yoga    Medical      Culinary  []Aging-Enhancing the Quality of Your Life  []Becoming a Pritikin   []Biology of Weight Control    []Cooking Breakfasts   []Decoding Lab Results    and Snacks  []Diseases of Our Time - Overview   []Cooking Dinner and   []Sleep Disorders     Sides  []Targeting Your Nutrition Priorities   []Healthy Salads &   Dressings  Nutrition      []Cooking Soups and   []Dining Out - Part 2     Desserts  []Fueling a Healthy Body   []Menu Workshop     Overview  []Planning Your Eating Strategy   []The Pritikin Solution  []Vitamins and Minerals    Comments:  Video completed, group discussion

## 2024-10-22 ENCOUNTER — HOSPITAL ENCOUNTER (OUTPATIENT)
Dept: CARDIAC REHAB | Age: 64
Setting detail: THERAPIES SERIES
Discharge: HOME | End: 2024-10-22
Payer: MEDICARE

## 2024-10-22 PROCEDURE — G0423 INTENS CARDIAC REHAB NO EXER: HCPCS

## 2024-10-22 PROCEDURE — G0422 INTENS CARDIAC REHAB W/EXERC: HCPCS

## 2024-10-22 ASSESSMENT — PATIENT HEALTH QUESTIONNAIRE - PHQ9
6. FEELING BAD ABOUT YOURSELF - OR THAT YOU ARE A FAILURE OR HAVE LET YOURSELF OR YOUR FAMILY DOWN: NOT AT ALL
SUM OF ALL RESPONSES TO PHQ QUESTIONS 1-9: 5
SUM OF ALL RESPONSES TO PHQ QUESTIONS 1-9: 5
5. POOR APPETITE OR OVEREATING: SEVERAL DAYS
2. FEELING DOWN, DEPRESSED OR HOPELESS: NOT AT ALL
9. THOUGHTS THAT YOU WOULD BE BETTER OFF DEAD, OR OF HURTING YOURSELF: NOT AT ALL
7. TROUBLE CONCENTRATING ON THINGS, SUCH AS READING THE NEWSPAPER OR WATCHING TELEVISION: SEVERAL DAYS
4. FEELING TIRED OR HAVING LITTLE ENERGY: SEVERAL DAYS
10. IF YOU CHECKED OFF ANY PROBLEMS, HOW DIFFICULT HAVE THESE PROBLEMS MADE IT FOR YOU TO DO YOUR WORK, TAKE CARE OF THINGS AT HOME, OR GET ALONG WITH OTHER PEOPLE: NOT DIFFICULT AT ALL
SUM OF ALL RESPONSES TO PHQ QUESTIONS 1-9: 5
1. LITTLE INTEREST OR PLEASURE IN DOING THINGS: SEVERAL DAYS
8. MOVING OR SPEAKING SO SLOWLY THAT OTHER PEOPLE COULD HAVE NOTICED. OR THE OPPOSITE, BEING SO FIGETY OR RESTLESS THAT YOU HAVE BEEN MOVING AROUND A LOT MORE THAN USUAL: NOT AT ALL
3. TROUBLE FALLING OR STAYING ASLEEP: SEVERAL DAYS
SUM OF ALL RESPONSES TO PHQ9 QUESTIONS 1 & 2: 1
SUM OF ALL RESPONSES TO PHQ QUESTIONS 1-9: 5

## 2024-10-22 NOTE — PROGRESS NOTES
Video Education Report - ICR/CR  Name:  Jarad Barton     Date:  10/22/2024  MRN: [id number]     Session #:  32  Session Length: 40 min    Exercise      Healthy Mind-Set  []Improving Performance    []Smoking Cessation    []Introduction to Yoga    Medical      Culinary  [x]Aging-Enhancing the Quality of Your Life  []Becoming a Pritikin   []Biology of Weight Control    []Cooking Breakfasts   []Decoding Lab Results    and Snacks  []Diseases of Our Time - Overview   []Cooking Dinner and   []Sleep Disorders     Sides  []Targeting Your Nutrition Priorities   []Healthy Salads &   Dressings  Nutrition      []Cooking Soups and   []Dining Out - Part 2     Desserts  []Fueling a Healthy Body   []Menu Workshop     Overview  []Planning Your Eating Strategy   []The Pritikin Solution  []Vitamins and Minerals    Comments:  Video completed, group discussion

## 2024-10-24 VITALS — WEIGHT: 175.6 LBS | HEIGHT: 69 IN | BODY MASS INDEX: 26.01 KG/M2

## 2024-10-24 NOTE — PLAN OF CARE
PM     Revision History    Date/Time User Provider Type Action   9/12/2024  2:23 PM Wili Silva MD Physician Cosign   9/12/2024 12:35 PM Lause, Christa A, RCP Respiratory Therapist Sign     Cosigned by: Wili Silva MD at 10/10/2024  4:59 PM     Revision History    Date/Time User Provider Type Action   10/10/2024  4:59 PM Wili Silva MD Physician Cosign   10/10/2024  9:45 AM Lause, Christa A, RCP Respiratory Therapist Sign   10/10/2024  9:44 AM Lause, Christa A, RCP Respiratory Therapist Sign    View Details Report

## 2024-11-07 ENCOUNTER — TELEPHONE (OUTPATIENT)
Dept: CARDIOLOGY CLINIC | Age: 64
End: 2024-11-07

## 2024-11-07 NOTE — TELEPHONE ENCOUNTER
Pt scheduled with [facility] next week, states that a home health nurse advised him to follow up due to a low heart rate during his circulation assessment. Pt's wife, Nisa, would like a call if any appointments open on 11/13 because that would work better with their schedule.

## 2024-11-12 ENCOUNTER — OFFICE VISIT (OUTPATIENT)
Dept: CARDIOLOGY CLINIC | Age: 64
End: 2024-11-12

## 2024-11-12 VITALS
HEIGHT: 69 IN | SYSTOLIC BLOOD PRESSURE: 134 MMHG | BODY MASS INDEX: 26.25 KG/M2 | WEIGHT: 177.2 LBS | HEART RATE: 42 BPM | DIASTOLIC BLOOD PRESSURE: 72 MMHG

## 2024-11-12 DIAGNOSIS — I25.10 CORONARY ARTERY DISEASE INVOLVING NATIVE CORONARY ARTERY OF NATIVE HEART WITHOUT ANGINA PECTORIS: Primary | ICD-10-CM

## 2024-11-12 DIAGNOSIS — Z95.820 S/P ANGIOPLASTY WITH STENT: ICD-10-CM

## 2024-11-12 DIAGNOSIS — R00.1 BRADYCARDIA: ICD-10-CM

## 2024-11-12 NOTE — PROGRESS NOTES
by mouth daily 30 tablet 3     No current facility-administered medications for this visit.       Social History     Socioeconomic History    Marital status:      Spouse name: Giovanna    Number of children: 4   Tobacco Use    Smoking status: Never    Smokeless tobacco: Never    Tobacco comments:     Never smoker    Vaping Use    Vaping status: Never Used   Substance and Sexual Activity    Alcohol use: Yes     Alcohol/week: 2.0 standard drinks of alcohol     Types: 2 Cans of beer per week     Comment: occ drinker    Drug use: Never    Sexual activity: Yes     Partners: Female     Social Determinants of Health     Physical Activity: Insufficiently Active (1/30/2024)    Exercise Vital Sign     Days of Exercise per Week: 1 day     Minutes of Exercise per Session: 20 min       Family History   Problem Relation Age of Onset    Dementia Mother     Heart Disease Father     No Known Problems Sister     No Known Problems Sister     No Known Problems Sister     No Known Problems Sister     No Known Problems Brother     No Known Problems Brother     Heart Attack Paternal Uncle     No Known Problems Maternal Grandmother     No Known Problems Maternal Grandfather     No Known Problems Paternal Grandmother     No Known Problems Paternal Grandfather        Blood pressure 134/72, pulse (!) 42, height 1.753 m (5' 9\"), weight 80.4 kg (177 lb 3.2 oz).    General:   Well developed, well nourished  Lungs:   Clear to auscultation  Heart:    Normal S1 S2, No murmur, rubs, or gallops  Abdomen:   Soft, non tender, no organomegalies, positive bowel sounds  Extremities:   No edema, no cyanosis, good peripheral pulses  Neurological:   Awake, alert, oriented. No obvious focal deficits  Musculoskeletal:  No obvious deformities      ECHO: 6/3/2024    Left Ventricle: Normal left ventricular systolic function with a visually estimated EF of 55 - 60%. Left ventricle size is normal. Normal wall thickness. Normal wall motion. Normal diastolic

## 2024-11-12 NOTE — PATIENT INSTRUCTIONS
You may receive a survey regarding the care you received during your visit. We encourage you to complete and return your survey, as your input is valuable to us. We hope you will choose us in the future for your healthcare needs. Thank you!    Your Medical Assistant today: CHRISTIANO Lujan  Thank you for coming to our office! It was a pleasure to serve you.

## 2024-11-29 NOTE — LETTER
[address] [facility]  [place] [address] [address] [address]  Phone: [phone]  Fax: [phone]    Mazin Jensen MD    September 3, 2020     Patient: Rachelle Bennett   MR Number: [id number]   YOB: 1960   Date of Visit: 9/2/2020     Dear Dr. Warner Godinez:    I recently saw your patient Tim Antonio in consultation. Below are the relevant portions of my assessment and plan of care. Assessment:  1. Incarcerated umbilical hernia    Plan:  1. Schedule Jarad for repair incarcerated umbilical hernia with mesh. 2. He will undergo pre-operative clearance per anesthesia guidelines with risk factors listed under the past medical history diagnosis & problem list.  3. The risks, benefits and alternatives were discussed with Jarad including non-operative management. The pros and cons of robotic, laparoscopic and open techniques were discussed. The pros and cons of mesh insertion were discussed. All questions answered. He understands and wishes to proceed with surgical intervention. 4. Restrictions discussed with Ana Contreras and he expresses understanding. 5. He is advised to call back directly if there are further questions/concerns, or if his symptoms worsen prior to surgery. If you have questions, please do not hesitate to call me. I look forward to following Ana Contreras along with you.     Sincerely,    Katya Krishnamurthy MD
Yes

## 2025-01-28 RX ORDER — TICAGRELOR 90 MG/1
90 TABLET ORAL 2 TIMES DAILY
Qty: 180 TABLET | Refills: 1 | Status: SHIPPED | OUTPATIENT
Start: 2025-01-28

## 2025-01-28 RX ORDER — ROSUVASTATIN CALCIUM 20 MG/1
20 TABLET, COATED ORAL NIGHTLY
Qty: 90 TABLET | Refills: 1 | Status: SHIPPED | OUTPATIENT
Start: 2025-01-28

## 2025-02-06 ENCOUNTER — TELEPHONE (OUTPATIENT)
Dept: SURGERY | Age: 65
End: 2025-02-06

## 2025-02-06 NOTE — TELEPHONE ENCOUNTER
Pt of Dr. Ward's last seen by [facility] on 11/24/24.  He has an appointment with Dr. Butt 2/19 to schedule his robotic bilateral inguinal hernia repair however he had stents placed 6/7/24.   Pt is complaining that the hernias are enlarging in size and more painful and would like to get surgery scheduled shortly after his office visit.  Will he be able to hold his Brilinta 5 days prior?  Can he be cleared?

## 2025-02-06 NOTE — TELEPHONE ENCOUNTER
Pt is also needing clearance for dysplastic nevus left upper back with Dr Bynum   Holding brilinta for 7 days     Fax [phone]  Phone [phone]    Please advise

## 2025-02-10 PROBLEM — Z95.5 HISTORY OF HEART ARTERY STENT: Status: ACTIVE | Noted: 2025-02-10

## 2025-02-11 ENCOUNTER — HOSPITAL ENCOUNTER (OUTPATIENT)
Dept: GENERAL RADIOLOGY | Age: 65
Discharge: HOME | End: 2025-02-11
Payer: MEDICARE

## 2025-02-11 ENCOUNTER — HOSPITAL ENCOUNTER (OUTPATIENT)
Age: 65
Discharge: HOME | End: 2025-02-11
Payer: MEDICARE

## 2025-02-11 DIAGNOSIS — G89.29 CHRONIC RIGHT SHOULDER PAIN: ICD-10-CM

## 2025-02-11 DIAGNOSIS — M25.511 CHRONIC RIGHT SHOULDER PAIN: ICD-10-CM

## 2025-02-11 PROCEDURE — 73030 X-RAY EXAM OF SHOULDER: CPT

## 2025-02-14 ENCOUNTER — HOSPITAL ENCOUNTER (OUTPATIENT)
Dept: ULTRASOUND IMAGING | Age: 65
Discharge: HOME | End: 2025-02-14
Payer: MEDICARE

## 2025-02-14 DIAGNOSIS — I65.23 BILATERAL CAROTID ARTERY STENOSIS: ICD-10-CM

## 2025-02-14 PROCEDURE — 93880 EXTRACRANIAL BILAT STUDY: CPT

## 2025-02-19 ENCOUNTER — PREP FOR PROCEDURE (OUTPATIENT)
Dept: SURGERY | Age: 65
End: 2025-02-19

## 2025-02-19 ENCOUNTER — OFFICE VISIT (OUTPATIENT)
Dept: SURGERY | Age: 65
End: 2025-02-19
Payer: MEDICARE

## 2025-02-19 VITALS
HEART RATE: 56 BPM | BODY MASS INDEX: 28.19 KG/M2 | DIASTOLIC BLOOD PRESSURE: 72 MMHG | RESPIRATION RATE: 18 BRPM | WEIGHT: 186 LBS | SYSTOLIC BLOOD PRESSURE: 130 MMHG | TEMPERATURE: 98.5 F | HEIGHT: 68 IN | OXYGEN SATURATION: 98 %

## 2025-02-19 DIAGNOSIS — K40.20 NON-RECURRENT BILATERAL INGUINAL HERNIA WITHOUT OBSTRUCTION OR GANGRENE: Primary | ICD-10-CM

## 2025-02-19 PROCEDURE — G8427 DOCREV CUR MEDS BY ELIG CLIN: HCPCS | Performed by: SURGERY

## 2025-02-19 PROCEDURE — 1123F ACP DISCUSS/DSCN MKR DOCD: CPT | Performed by: SURGERY

## 2025-02-19 PROCEDURE — G8419 CALC BMI OUT NRM PARAM NOF/U: HCPCS | Performed by: SURGERY

## 2025-02-19 PROCEDURE — 1036F TOBACCO NON-USER: CPT | Performed by: SURGERY

## 2025-02-19 PROCEDURE — 1125F AMNT PAIN NOTED PAIN PRSNT: CPT | Performed by: SURGERY

## 2025-02-19 PROCEDURE — 99213 OFFICE O/P EST LOW 20 MIN: CPT | Performed by: SURGERY

## 2025-02-19 PROCEDURE — 3017F COLORECTAL CA SCREEN DOC REV: CPT | Performed by: SURGERY

## 2025-02-20 ASSESSMENT — ENCOUNTER SYMPTOMS
STRIDOR: 0
RHINORRHEA: 0
SINUS PRESSURE: 0
BLOOD IN STOOL: 0
VOMITING: 0
ANAL BLEEDING: 0
DIARRHEA: 0
CHOKING: 0
CONSTIPATION: 0
EYE DISCHARGE: 0
SORE THROAT: 0
CHEST TIGHTNESS: 0
FACIAL SWELLING: 0
COLOR CHANGE: 0
APNEA: 0
NAUSEA: 0
EYE REDNESS: 0
VOICE CHANGE: 0
EYE PAIN: 0
TROUBLE SWALLOWING: 0
ABDOMINAL PAIN: 0
BACK PAIN: 0
COUGH: 0
RECTAL PAIN: 0
SHORTNESS OF BREATH: 0
WHEEZING: 0
PHOTOPHOBIA: 0
ABDOMINAL DISTENTION: 0
EYE ITCHING: 0
ALLERGIC/IMMUNOLOGIC NEGATIVE: 1

## 2025-02-20 NOTE — PROGRESS NOTES
Jarad Barton (:  1960)     ASSESSMENT:  1.  Bilateral inguinal hernias  2.  Diastases recti  3.  History repair incarcerated umbilical hernia with mesh (2020)    PLAN:  1. Schedule Jarad for robotic possible open repair bilateral inguinal hernias with mesh.  2. He will undergo pre-operative clearance per anesthesia guidelines with risk factors listed under the past medical history diagnosis & problem list.  3. The risks, benefits and alternatives were discussed with Jarad including non-operative management.  The pros and cons of robotic, laparoscopic and open techniques were discussed.  The pros and cons of mesh insertion were discussed.  All questions answered. He understands and wishes to proceed with surgical intervention.  4. Restrictions discussed with Jarad and he expresses understanding.  5. He is advised to call back directly if there are further questions/concerns, or if his symptoms worsen prior to surgery.    -- Will try to plan surgery and coordinate with dermatology as they are trying to proceed with back excision of the lesion while patient is off of blood thinners.    SUBJECTIVE/OBJECTIVE:    Chief Complaint   Patient presents with    Follow-up     Est pt last seen 24 - Bilateral inguinal hernias, ready to schedule surgery     HPI  Jarad is a 64-year-old male who presents with bilateral inguinal hernias.  He states his right side is starting to get much worse.  Bulge is getting larger.  More discomfort.  Worse with increased activity, lifting and bending over.  Worsens with certain movements and as he goes throughout his day.  Wanted to get these repaired towards the beginning of  but ran into heart issues that required cardiac cath and stent placement.  Currently on blood thinners.  He is now allowed to have the blood thinners stopped.  Also planning with dermatology to have a back lesion excised.  Hoping to have this done along with his hernias while he is

## 2025-03-06 NOTE — H&P
Jarad Barton (:  1960)      ASSESSMENT:  1.  Bilateral inguinal hernias  2.  Diastases recti  3.  History repair incarcerated umbilical hernia with mesh (2020)     PLAN:  1. Schedule Jarad for robotic possible open repair bilateral inguinal hernias with mesh.  2. He will undergo pre-operative clearance per anesthesia guidelines with risk factors listed under the past medical history diagnosis & problem list.  3. The risks, benefits and alternatives were discussed with Jarad including non-operative management.  The pros and cons of robotic, laparoscopic and open techniques were discussed.  The pros and cons of mesh insertion were discussed.  All questions answered. He understands and wishes to proceed with surgical intervention.  4. Restrictions discussed with Jarad and he expresses understanding.  5. He is advised to call back directly if there are further questions/concerns, or if his symptoms worsen prior to surgery.     -- Will try to plan surgery and coordinate with dermatology as they are trying to proceed with back excision of the lesion while patient is off of blood thinners.     SUBJECTIVE/OBJECTIVE:          Chief Complaint   Patient presents with    Follow-up       Est pt last seen 24 - Bilateral inguinal hernias, ready to schedule surgery      HPI  Jarad is a 64-year-old male who presents with bilateral inguinal hernias.  He states his right side is starting to get much worse.  Bulge is getting larger.  More discomfort.  Worse with increased activity, lifting and bending over.  Worsens with certain movements and as he goes throughout his day.  Wanted to get these repaired towards the beginning of  but ran into heart issues that required cardiac cath and stent placement.  Currently on blood thinners.  He is now allowed to have the blood thinners stopped.  Also planning with dermatology to have a back lesion excised.  Hoping to have this done along with his hernias

## 2025-03-10 ENCOUNTER — PREP FOR PROCEDURE (OUTPATIENT)
Dept: SURGERY | Age: 65
End: 2025-03-10

## 2025-03-10 RX ORDER — SODIUM CHLORIDE 0.9 % (FLUSH) 0.9 %
5-40 SYRINGE (ML) INJECTION EVERY 12 HOURS SCHEDULED
Status: CANCELLED | OUTPATIENT
Start: 2025-03-10

## 2025-03-10 RX ORDER — SODIUM CHLORIDE 0.9 % (FLUSH) 0.9 %
5-40 SYRINGE (ML) INJECTION PRN
Status: CANCELLED | OUTPATIENT
Start: 2025-03-10

## 2025-03-10 RX ORDER — SODIUM CHLORIDE 9 MG/ML
INJECTION, SOLUTION INTRAVENOUS PRN
Status: CANCELLED | OUTPATIENT
Start: 2025-03-10

## 2025-03-10 RX ORDER — SODIUM CHLORIDE 9 MG/ML
INJECTION, SOLUTION INTRAVENOUS CONTINUOUS
Status: CANCELLED | OUTPATIENT
Start: 2025-03-10

## 2025-03-14 ENCOUNTER — ANESTHESIA EVENT (OUTPATIENT)
Dept: OPERATING ROOM | Age: 65
End: 2025-03-14
Payer: MEDICARE

## 2025-03-14 ENCOUNTER — ANESTHESIA (OUTPATIENT)
Dept: OPERATING ROOM | Age: 65
End: 2025-03-14
Payer: MEDICARE

## 2025-03-14 ENCOUNTER — HOSPITAL ENCOUNTER (OUTPATIENT)
Age: 65
Setting detail: OUTPATIENT SURGERY
Discharge: HOME | End: 2025-03-14
Attending: SURGERY | Admitting: SURGERY
Payer: MEDICARE

## 2025-03-14 VITALS
OXYGEN SATURATION: 100 % | RESPIRATION RATE: 18 BRPM | DIASTOLIC BLOOD PRESSURE: 81 MMHG | SYSTOLIC BLOOD PRESSURE: 150 MMHG | BODY MASS INDEX: 27.16 KG/M2 | HEIGHT: 69 IN | WEIGHT: 183.4 LBS | HEART RATE: 66 BPM | TEMPERATURE: 97.2 F

## 2025-03-14 DIAGNOSIS — Z87.19 S/P BILATERAL INGUINAL HERNIA REPAIR: Primary | ICD-10-CM

## 2025-03-14 DIAGNOSIS — Z98.890 S/P BILATERAL INGUINAL HERNIA REPAIR: Primary | ICD-10-CM

## 2025-03-14 PROCEDURE — 2580000003 HC RX 258: Performed by: SURGERY

## 2025-03-14 PROCEDURE — 2709999900 HC NON-CHARGEABLE SUPPLY: Performed by: SURGERY

## 2025-03-14 PROCEDURE — S2900 ROBOTIC SURGICAL SYSTEM: HCPCS | Performed by: SURGERY

## 2025-03-14 PROCEDURE — 2500000003 HC RX 250 WO HCPCS: Performed by: NURSE ANESTHETIST, CERTIFIED REGISTERED

## 2025-03-14 PROCEDURE — 7100000010 HC PHASE II RECOVERY - FIRST 15 MIN: Performed by: SURGERY

## 2025-03-14 PROCEDURE — 7100000011 HC PHASE II RECOVERY - ADDTL 15 MIN: Performed by: SURGERY

## 2025-03-14 PROCEDURE — 6360000002 HC RX W HCPCS: Performed by: SURGERY

## 2025-03-14 PROCEDURE — 7100000001 HC PACU RECOVERY - ADDTL 15 MIN: Performed by: SURGERY

## 2025-03-14 PROCEDURE — 7100000000 HC PACU RECOVERY - FIRST 15 MIN: Performed by: SURGERY

## 2025-03-14 PROCEDURE — 6360000002 HC RX W HCPCS: Performed by: NURSE ANESTHETIST, CERTIFIED REGISTERED

## 2025-03-14 PROCEDURE — 3600000019 HC SURGERY ROBOT ADDTL 15MIN: Performed by: SURGERY

## 2025-03-14 PROCEDURE — 6370000000 HC RX 637 (ALT 250 FOR IP): Performed by: NURSE PRACTITIONER

## 2025-03-14 PROCEDURE — 3700000000 HC ANESTHESIA ATTENDED CARE: Performed by: SURGERY

## 2025-03-14 PROCEDURE — 2500000003 HC RX 250 WO HCPCS: Performed by: SURGERY

## 2025-03-14 PROCEDURE — 3700000001 HC ADD 15 MINUTES (ANESTHESIA): Performed by: SURGERY

## 2025-03-14 PROCEDURE — 6360000002 HC RX W HCPCS

## 2025-03-14 PROCEDURE — 49650 LAP ING HERNIA REPAIR INIT: CPT | Performed by: SURGERY

## 2025-03-14 PROCEDURE — 6360000002 HC RX W HCPCS: Performed by: ANESTHESIOLOGY

## 2025-03-14 PROCEDURE — 3600000009 HC SURGERY ROBOT BASE: Performed by: SURGERY

## 2025-03-14 PROCEDURE — C1781 MESH (IMPLANTABLE): HCPCS | Performed by: SURGERY

## 2025-03-14 DEVICE — 3DMAX MID ANATOMICAL MESH, 10 CM X 16 CM (4" X 6"), LARGE, RIGHT
Type: IMPLANTABLE DEVICE | Site: INGUINAL | Status: FUNCTIONAL
Brand: 3DMAX

## 2025-03-14 DEVICE — 3DMAX MID ANATOMICAL MESH, 10 CM X 16 CM (4" X 6"), LARGE, LEFT
Type: IMPLANTABLE DEVICE | Site: INGUINAL | Status: FUNCTIONAL
Brand: 3DMAX

## 2025-03-14 RX ORDER — SODIUM CHLORIDE 0.9 % (FLUSH) 0.9 %
5-40 SYRINGE (ML) INJECTION PRN
Status: DISCONTINUED | OUTPATIENT
Start: 2025-03-14 | End: 2025-03-14 | Stop reason: HOSPADM

## 2025-03-14 RX ORDER — DEXAMETHASONE SODIUM PHOSPHATE 4 MG/ML
INJECTION, SOLUTION INTRA-ARTICULAR; INTRALESIONAL; INTRAMUSCULAR; INTRAVENOUS; SOFT TISSUE
Status: DISCONTINUED | OUTPATIENT
Start: 2025-03-14 | End: 2025-03-14 | Stop reason: SDUPTHER

## 2025-03-14 RX ORDER — PROPOFOL 10 MG/ML
INJECTION, EMULSION INTRAVENOUS
Status: DISCONTINUED | OUTPATIENT
Start: 2025-03-14 | End: 2025-03-14 | Stop reason: SDUPTHER

## 2025-03-14 RX ORDER — SODIUM CHLORIDE 9 MG/ML
INJECTION, SOLUTION INTRAVENOUS PRN
Status: CANCELLED | OUTPATIENT
Start: 2025-03-14

## 2025-03-14 RX ORDER — LIDOCAINE HCL/PF 100 MG/5ML
SYRINGE (ML) INJECTION
Status: DISCONTINUED | OUTPATIENT
Start: 2025-03-14 | End: 2025-03-14 | Stop reason: SDUPTHER

## 2025-03-14 RX ORDER — SODIUM CHLORIDE 0.9 % (FLUSH) 0.9 %
5-40 SYRINGE (ML) INJECTION EVERY 12 HOURS SCHEDULED
Status: DISCONTINUED | OUTPATIENT
Start: 2025-03-14 | End: 2025-03-14 | Stop reason: HOSPADM

## 2025-03-14 RX ORDER — HYDROCODONE BITARTRATE AND ACETAMINOPHEN 5; 325 MG/1; MG/1
1 TABLET ORAL EVERY 4 HOURS PRN
Status: DISCONTINUED | OUTPATIENT
Start: 2025-03-14 | End: 2025-03-14 | Stop reason: HOSPADM

## 2025-03-14 RX ORDER — GLYCOPYRROLATE 1 MG/5 ML
SYRINGE (ML) INTRAVENOUS
Status: DISCONTINUED | OUTPATIENT
Start: 2025-03-14 | End: 2025-03-14 | Stop reason: SDUPTHER

## 2025-03-14 RX ORDER — MORPHINE SULFATE 4 MG/ML
4 INJECTION, SOLUTION INTRAMUSCULAR; INTRAVENOUS
Refills: 0 | Status: CANCELLED | OUTPATIENT
Start: 2025-03-14

## 2025-03-14 RX ORDER — HYDROCODONE BITARTRATE AND ACETAMINOPHEN 5; 325 MG/1; MG/1
2 TABLET ORAL EVERY 4 HOURS PRN
Status: DISCONTINUED | OUTPATIENT
Start: 2025-03-14 | End: 2025-03-14 | Stop reason: HOSPADM

## 2025-03-14 RX ORDER — SODIUM CHLORIDE 0.9 % (FLUSH) 0.9 %
5-40 SYRINGE (ML) INJECTION EVERY 12 HOURS SCHEDULED
Status: CANCELLED | OUTPATIENT
Start: 2025-03-14

## 2025-03-14 RX ORDER — FENTANYL CITRATE 50 UG/ML
INJECTION, SOLUTION INTRAMUSCULAR; INTRAVENOUS
Status: COMPLETED
Start: 2025-03-14 | End: 2025-03-14

## 2025-03-14 RX ORDER — ONDANSETRON 2 MG/ML
4 INJECTION INTRAMUSCULAR; INTRAVENOUS EVERY 6 HOURS PRN
Status: CANCELLED | OUTPATIENT
Start: 2025-03-14

## 2025-03-14 RX ORDER — ONDANSETRON 2 MG/ML
INJECTION INTRAMUSCULAR; INTRAVENOUS
Status: DISCONTINUED | OUTPATIENT
Start: 2025-03-14 | End: 2025-03-14 | Stop reason: SDUPTHER

## 2025-03-14 RX ORDER — HYDRALAZINE HYDROCHLORIDE 20 MG/ML
5 INJECTION INTRAMUSCULAR; INTRAVENOUS ONCE
Status: COMPLETED | OUTPATIENT
Start: 2025-03-14 | End: 2025-03-14

## 2025-03-14 RX ORDER — SODIUM CHLORIDE 9 MG/ML
INJECTION, SOLUTION INTRAVENOUS PRN
Status: DISCONTINUED | OUTPATIENT
Start: 2025-03-14 | End: 2025-03-14 | Stop reason: HOSPADM

## 2025-03-14 RX ORDER — FENTANYL CITRATE 50 UG/ML
50 INJECTION, SOLUTION INTRAMUSCULAR; INTRAVENOUS EVERY 5 MIN PRN
Status: COMPLETED | OUTPATIENT
Start: 2025-03-14 | End: 2025-03-14

## 2025-03-14 RX ORDER — HYDRALAZINE HYDROCHLORIDE 20 MG/ML
INJECTION INTRAMUSCULAR; INTRAVENOUS
Status: COMPLETED
Start: 2025-03-14 | End: 2025-03-14

## 2025-03-14 RX ORDER — MORPHINE SULFATE 2 MG/ML
2 INJECTION, SOLUTION INTRAMUSCULAR; INTRAVENOUS
Refills: 0 | Status: CANCELLED | OUTPATIENT
Start: 2025-03-14

## 2025-03-14 RX ORDER — ROCURONIUM BROMIDE 10 MG/ML
INJECTION, SOLUTION INTRAVENOUS
Status: DISCONTINUED | OUTPATIENT
Start: 2025-03-14 | End: 2025-03-14 | Stop reason: SDUPTHER

## 2025-03-14 RX ORDER — HYDROCODONE BITARTRATE AND ACETAMINOPHEN 5; 325 MG/1; MG/1
1-2 TABLET ORAL EVERY 6 HOURS PRN
Qty: 28 TABLET | Refills: 0 | Status: SHIPPED | OUTPATIENT
Start: 2025-03-14 | End: 2025-03-21

## 2025-03-14 RX ORDER — NALOXONE HYDROCHLORIDE 0.4 MG/ML
INJECTION, SOLUTION INTRAMUSCULAR; INTRAVENOUS; SUBCUTANEOUS PRN
Status: DISCONTINUED | OUTPATIENT
Start: 2025-03-14 | End: 2025-03-14 | Stop reason: HOSPADM

## 2025-03-14 RX ORDER — SODIUM CHLORIDE 0.9 % (FLUSH) 0.9 %
5-40 SYRINGE (ML) INJECTION PRN
Status: CANCELLED | OUTPATIENT
Start: 2025-03-14

## 2025-03-14 RX ORDER — FENTANYL CITRATE 50 UG/ML
INJECTION, SOLUTION INTRAMUSCULAR; INTRAVENOUS
Status: DISCONTINUED | OUTPATIENT
Start: 2025-03-14 | End: 2025-03-14 | Stop reason: SDUPTHER

## 2025-03-14 RX ORDER — ONDANSETRON 2 MG/ML
4 INJECTION INTRAMUSCULAR; INTRAVENOUS
Status: DISCONTINUED | OUTPATIENT
Start: 2025-03-14 | End: 2025-03-14 | Stop reason: HOSPADM

## 2025-03-14 RX ORDER — SODIUM CHLORIDE 9 MG/ML
INJECTION, SOLUTION INTRAVENOUS CONTINUOUS
Status: DISCONTINUED | OUTPATIENT
Start: 2025-03-14 | End: 2025-03-14 | Stop reason: HOSPADM

## 2025-03-14 RX ORDER — MEPERIDINE HYDROCHLORIDE 25 MG/ML
12.5 INJECTION INTRAMUSCULAR; INTRAVENOUS; SUBCUTANEOUS EVERY 5 MIN PRN
Status: DISCONTINUED | OUTPATIENT
Start: 2025-03-14 | End: 2025-03-14 | Stop reason: HOSPADM

## 2025-03-14 RX ORDER — BUPIVACAINE HYDROCHLORIDE 5 MG/ML
INJECTION, SOLUTION PERINEURAL PRN
Status: DISCONTINUED | OUTPATIENT
Start: 2025-03-14 | End: 2025-03-14 | Stop reason: ALTCHOICE

## 2025-03-14 RX ORDER — MIDAZOLAM HYDROCHLORIDE 1 MG/ML
INJECTION, SOLUTION INTRAMUSCULAR; INTRAVENOUS
Status: DISCONTINUED | OUTPATIENT
Start: 2025-03-14 | End: 2025-03-14 | Stop reason: SDUPTHER

## 2025-03-14 RX ORDER — ONDANSETRON 4 MG/1
4 TABLET, ORALLY DISINTEGRATING ORAL EVERY 8 HOURS PRN
Status: CANCELLED | OUTPATIENT
Start: 2025-03-14

## 2025-03-14 RX ADMIN — PROPOFOL 50 MG: 10 INJECTION, EMULSION INTRAVENOUS at 13:14

## 2025-03-14 RX ADMIN — HYDROMORPHONE HYDROCHLORIDE 0.5 MG: 1 INJECTION, SOLUTION INTRAMUSCULAR; INTRAVENOUS; SUBCUTANEOUS at 14:25

## 2025-03-14 RX ADMIN — MIDAZOLAM 2 MG: 1 INJECTION INTRAMUSCULAR; INTRAVENOUS at 12:54

## 2025-03-14 RX ADMIN — Medication 0.2 MG: at 13:38

## 2025-03-14 RX ADMIN — ONDANSETRON 4 MG: 2 INJECTION, SOLUTION INTRAMUSCULAR; INTRAVENOUS at 13:06

## 2025-03-14 RX ADMIN — SUGAMMADEX 200 MG: 100 INJECTION, SOLUTION INTRAVENOUS at 13:52

## 2025-03-14 RX ADMIN — HYDROCODONE BITARTRATE AND ACETAMINOPHEN 1 TABLET: 5; 325 TABLET ORAL at 16:18

## 2025-03-14 RX ADMIN — HYDRALAZINE HYDROCHLORIDE 5 MG: 20 INJECTION INTRAMUSCULAR; INTRAVENOUS at 14:55

## 2025-03-14 RX ADMIN — FENTANYL CITRATE 100 MCG: 50 INJECTION, SOLUTION INTRAMUSCULAR; INTRAVENOUS at 13:57

## 2025-03-14 RX ADMIN — FENTANYL CITRATE 50 MCG: 50 INJECTION, SOLUTION INTRAMUSCULAR; INTRAVENOUS at 13:14

## 2025-03-14 RX ADMIN — ROCURONIUM BROMIDE 10 MG: 50 INJECTION INTRAVENOUS at 13:33

## 2025-03-14 RX ADMIN — WATER 2000 MG: 1 INJECTION INTRAMUSCULAR; INTRAVENOUS; SUBCUTANEOUS at 12:54

## 2025-03-14 RX ADMIN — HYDROMORPHONE HYDROCHLORIDE 0.5 MG: 1 INJECTION, SOLUTION INTRAMUSCULAR; INTRAVENOUS; SUBCUTANEOUS at 14:30

## 2025-03-14 RX ADMIN — ROCURONIUM BROMIDE 40 MG: 50 INJECTION INTRAVENOUS at 13:02

## 2025-03-14 RX ADMIN — DEXAMETHASONE SODIUM PHOSPHATE 10 MG: 4 INJECTION, SOLUTION INTRAMUSCULAR; INTRAVENOUS at 13:07

## 2025-03-14 RX ADMIN — Medication 100 MG: at 13:01

## 2025-03-14 RX ADMIN — FENTANYL CITRATE 50 MCG: 50 INJECTION, SOLUTION INTRAMUSCULAR; INTRAVENOUS at 13:01

## 2025-03-14 RX ADMIN — FENTANYL CITRATE 50 MCG: 50 INJECTION, SOLUTION INTRAMUSCULAR; INTRAVENOUS at 14:20

## 2025-03-14 RX ADMIN — FENTANYL CITRATE 50 MCG: 50 INJECTION, SOLUTION INTRAMUSCULAR; INTRAVENOUS at 14:35

## 2025-03-14 RX ADMIN — SODIUM CHLORIDE: 0.9 INJECTION, SOLUTION INTRAVENOUS at 11:07

## 2025-03-14 RX ADMIN — PROPOFOL 200 MG: 10 INJECTION, EMULSION INTRAVENOUS at 13:01

## 2025-03-14 ASSESSMENT — PAIN SCALES - GENERAL
PAINLEVEL_OUTOF10: 5
PAINLEVEL_OUTOF10: 4
PAINLEVEL_OUTOF10: 2
PAINLEVEL_OUTOF10: 4
PAINLEVEL_OUTOF10: 7
PAINLEVEL_OUTOF10: 4
PAINLEVEL_OUTOF10: 7
PAINLEVEL_OUTOF10: 0
PAINLEVEL_OUTOF10: 2
PAINLEVEL_OUTOF10: 7
PAINLEVEL_OUTOF10: 7
PAINLEVEL_OUTOF10: 4
PAINLEVEL_OUTOF10: 5
PAINLEVEL_OUTOF10: 4

## 2025-03-14 ASSESSMENT — PAIN DESCRIPTION - FREQUENCY
FREQUENCY: CONTINUOUS
FREQUENCY: CONTINUOUS

## 2025-03-14 ASSESSMENT — PAIN - FUNCTIONAL ASSESSMENT
PAIN_FUNCTIONAL_ASSESSMENT: ACTIVITIES ARE NOT PREVENTED
PAIN_FUNCTIONAL_ASSESSMENT: WONG-BAKER FACES

## 2025-03-14 ASSESSMENT — PAIN DESCRIPTION - DESCRIPTORS
DESCRIPTORS: DISCOMFORT
DESCRIPTORS: ACHING;SORE
DESCRIPTORS: DISCOMFORT

## 2025-03-14 ASSESSMENT — PAIN DESCRIPTION - ORIENTATION
ORIENTATION: LOWER
ORIENTATION: LOWER
ORIENTATION: RIGHT;LEFT

## 2025-03-14 ASSESSMENT — PAIN DESCRIPTION - LOCATION
LOCATION: ABDOMEN

## 2025-03-14 ASSESSMENT — PAIN DESCRIPTION - ONSET
ONSET: ON-GOING
ONSET: ON-GOING

## 2025-03-14 ASSESSMENT — PAIN DESCRIPTION - PAIN TYPE
TYPE: ACUTE PAIN;SURGICAL PAIN
TYPE: SURGICAL PAIN

## 2025-03-14 ASSESSMENT — PAIN SCALES - WONG BAKER: WONGBAKER_NUMERICALRESPONSE: NO HURT

## 2025-03-14 NOTE — INTERVAL H&P NOTE
Update History & Physical    The patient's History and Physical was reviewed with the patient and I examined the patient. There was no change. The surgical site was confirmed by the patient and me.     Plan: The risks, benefits, expected outcome, and alternative to the recommended procedure have been discussed with the patient. Patient understands and wants to proceed with the procedure.     Electronically signed by Richard Butt MD on 3/14/2025 at 10:41 AM

## 2025-03-14 NOTE — PROGRESS NOTES
1401 Patient arrived to PACU. Patient unresponsive post anesthesia with OPA and simple mask in place. Abdominal incisions x3 CDI with no drainage present. Abdominal binder in place. Ice pack applied. Respirations even and unlabored. VSS.    1410 Patient arouses to voice. OPA and simple mask removed at this time. 3 L NC applied. Patient encouraged to cough and take deep breaths. Patient states pain 5/10 and tolerable at this time. Respirations even and unlabored. VSS.    1420 Patient complains of pain 7/10. Patient medicated with 50 mcg of Fentanyl at this time.    1425 Patient complains of pain 7/10. Patient medicated with 0.5 mg of Dilaudid at this time.    1430 Patient complains of pain 7/10. Patient medicated with 0.5 mg of Dilaudid at this time.    1435 Patient complains of pain 7/10. Patient medicated with 50 mcg of Fentanyl at this time.    1440 Patient resting in bed with eyes closed, but arouses to voice. Patient states pain now 4/10 and tolerable at this time. Respirations even and unlabored. VSS.    1455 5 mg of IV Hydralazine given at this time for SBP >180 for two consecutive readings.    1515 Patient meets criteria to discharge from PACU at this time. Patient transported to [facility] in stable condition with all belongings.

## 2025-03-14 NOTE — ANESTHESIA POSTPROCEDURE EVALUATION
Department of Anesthesiology  Postprocedure Note    Patient: Jarad Barton  MRN: [id number]  YOB: 1960  Date of evaluation: 3/14/2025    Procedure Summary       Date: 03/14/25 Room / Location: [facility] OR  / [facility] OR    Anesthesia Start: 1254 Anesthesia Stop: 1408    Procedure: Robotic bilateral inguinal hernia repair with mesh (Bilateral: Abdomen) Diagnosis:       Bilateral inguinal hernia      (Bilateral inguinal hernia [K40.20])    Surgeons: Richard Butt MD Responsible Provider: Yong Kennedy DO    Anesthesia Type: general ASA Status: 3            Anesthesia Type: No value filed.    Meghan Phase I: Meghan Score: 5    Meghan Phase II:      Anesthesia Post Evaluation    Patient location during evaluation: PACU  Patient participation: complete - patient participated  Level of consciousness: awake and alert  Pain score: 4  Airway patency: patent  Nausea & Vomiting: no nausea and no vomiting  Cardiovascular status: hemodynamically stable and blood pressure returned to baseline  Respiratory status: spontaneous ventilation, room air and acceptable  Hydration status: stable  Pain management: adequate and satisfactory to patient    No notable events documented.

## 2025-03-14 NOTE — BRIEF OP NOTE
Brief Postoperative Note      Patient: Jarad Barton  YOB: 1960  MRN: [id number]    Date of Procedure: 3/14/2025    Pre-Op Diagnosis Codes:      * Bilateral inguinal hernia [K40.20]    Post-Op Diagnosis: Same       Procedure(s):  Robotic bilateral inguinal hernia repair with mesh    Surgeon(s):  Richard Butt MD    Assistant:  First Assistant: Gaurang Reed, APRN - CNP; Cornel Brand, RN    Anesthesia: General    Estimated Blood Loss (mL): 10ml    Complications: None    Specimens:   * No specimens in log *    Implants:  Implant Name Type Inv. Item Serial No.  Lot No. LRB No. Used Action   MESH CS RIGHT LARGE 10CM X 16CM - [id number]  MESH CS RIGHT LARGE 10CM X 16CM  [employer]WD [id number] Right 1 Implanted   MESH CS LEFT LARGE 10CM X 16CM - [id number]  MESH CS LEFT LARGE 10CM X 16CM  [employer]WD [id number] Left 1 Implanted         Drains: * No LDAs found *    Findings:  Infection Present At Time Of Surgery (PATOS) (choose all levels that have infection present):  No infection present    Other Findings: see op note    Electronically signed by Richard Butt MD on 3/14/2025 at 2:03 PM

## 2025-03-14 NOTE — DISCHARGE INSTRUCTIONS
Brilinta until tomorrow evening 3/15/25    Use Colace and MiraLAX asneeded to prevent constipation.  Do not allow yourself to become constipated.    Drink at least 64 ounces of liquids per day.    WOUND/DRESSING INSTRUCTIONS:  Always ensure you and your care giver clean hands before and after caring for the wound.  [x] Allow surgical glue to fall off on their own.   [x] Ice operative site for 20 minutes 4 times a day as needed.     [x] May wash over incision in shower, but do not soak in a bath.  [x] Keep the abdominal binder in place during the day. May remove to shower and at night.    ABDOMINAL/LAPAROSCOPIC SURGERY  [x]You are encouraged to get up and move around as this helps with circulation, prevents blood clots from forming and speeds up the healing process. Call the office if you develop pain or swelling in your legs. Do not massage sore muscles in the legs.  [x]Breath deeply and cough from time to time. This helps to clear your lungs and helps prevent pneumonia.  [x]Supporting your incision with a pillow or your hand helps to minimize discomfort and pain.  [x]Laparoscopic patients may develop shoulder pain in the first 48 hours from the gas used during the procedure a heating pad may help alleviate this discomfort.    FOLLOW-UP CARE. SPECIFICALLY WATCH FOR:   Fever over 101 degrees by mouth   Increased redness, warmth, hardness at operative site.   Blood soaked dressing (small amounts of oozing may be normal.)   Increased or progressive drainage from the surgical area   Inability to urinate or blood in the urine   Pain not relieved by the medications ordered   Persistent nausea and/or vomiting, unable to retain fluids.   Pain or swelling in your legs.   Shortness of breath.  Call the office if you develop any of the above symptoms.     FOLLOW-UP APPOINTMENT   []1 week   [x]2 weeks:    []Other    Call my office if you have any problem that concerns you [phone]. After hours, you can reach the answering  service via the office phone number. IF YOU NEED IMMEDIATE ATTENTION, GO TO THE EMERGENCY ROOM AND YOUR DOCTOR WILL BE CONTACTED.      Prepared by Gaurang Reed CNP for   TARAS NIX MD FACS  [id number]

## 2025-03-14 NOTE — PROGRESS NOTES
Patient oriented to Same Day department and admitted to Same Day Surgery room 16.   Patient verbalized approval for first name, last initial with physician name on unit whiteboard.     Plan of care reviewed with patient.   Patient room whiteboard filled out and discussed with patient and responsible adult.   Patient and responsible adult offered Same Day Welcome Packet to review.    Call light in reach.   Bed in lowest position, locked, with one bed rail up.   SCDs and warming blanket in place.  Appropriate arm bands on patient.   Bathroom offered.   All questions and concerns of patient addressed.        Meds to Beds:   Patient informed of [facility] program during admission. Patient is agreeable to program.   Contact information for the pharmacy and the Meds to Beds program:   Name: Nisa   Relationship to patient:spouse/significant other   Phone number: [phone]  [place]

## 2025-03-14 NOTE — ADDENDUM NOTE
Addendum  created 03/14/25 1528 by Concepcion Ramirez APRN - DAVID    Intraprocedure Meds edited

## 2025-03-14 NOTE — ANESTHESIA PRE PROCEDURE
Department of Anesthesiology  Preprocedure Note       Name:  Jarad Barton   Age:  64 y.o.  :  1960                                          MRN:  [id number]         Date:  3/14/2025      Surgeon: Surgeon(s):  Richard Butt MD    Procedure: Procedure(s):  Robotic bilateral inguinal hernia repair with mesh possible open    Medications prior to admission:   Prior to Admission medications    Medication Sig Start Date End Date Taking? Authorizing Provider   HYDROcodone-acetaminophen (NORCO) 5-325 MG per tablet Take 1-2 tablets by mouth every 6 hours as needed for Pain for up to 7 days. Take lowest dose possible to manage pain Max Daily Amount: 8 tablets 3/14/25 3/21/25 Yes Gaurang Reed, APRN - CNP   rosuvastatin (CRESTOR) 20 MG tablet Take 1 tablet by mouth nightly 25  Yes Bee Aguayo MD   BRILINTA 90 MG TABS tablet Take 1 tablet by mouth twice daily 25   Bee Aguayo MD   aspirin 81 MG EC tablet Take 1 tablet by mouth daily 24   Mortimer, Connor, PA-C       Current medications:    Current Facility-Administered Medications   Medication Dose Route Frequency Provider Last Rate Last Admin   • sodium chloride flush 0.9 % injection 5-40 mL  5-40 mL IntraVENous 2 times per day Richard Butt MD       • sodium chloride flush 0.9 % injection 5-40 mL  5-40 mL IntraVENous PRN Richard Butt MD       • 0.9 % sodium chloride infusion   IntraVENous PRN Richard Butt MD       • 0.9 % sodium chloride infusion   IntraVENous Continuous Richard Butt MD       • ceFAZolin (ANCEF) 2,000 mg in sterile water 20 mL IV syringe  2,000 mg IntraVENous On Call to OR Richard Butt MD           Allergies:  No Known Allergies    Problem List:    Patient Active Problem List   Diagnosis Code   • Bilateral carotid artery stenosis I65.23   • Malignant melanoma of torso excluding breast (HCC) C43.59   • Abnormal stress test R94.39   • Status post angioplasty with stent

## 2025-03-14 NOTE — OP NOTE
intraabdominal area.  The larger direct hernia sac was reduced as well.  No femoral hernia.  Hemostasis was adequate.  Large 3DMax mid-weight mesh was then brought in and secured.  This was secured to pubic tubercle with 0 Ethibond suture x2 medially and then 3 times to the anterior abdominal wall.  This completely obliterated the defects.  Strengthened the floor appropriately.  During dissection as well as during suture placement, care was taken to avoid neurovascular bundles, triangle of pain, and the triangle of doom.  Peritoneal flaps were then reapproximated with a running 3-0 V-Loc.  During closure, incorporation of the hernia sacs up to the undersurface of the anterior abdominal wall occurred.  A mirror-image incision was then made there on the left side.  Similar preperitoneal space dissection was carried out with monopolar scissors.  Cord structures were circumferentially dissected free.  No significant cord lipoma.  No significant indirect hernia sac.  The large direct hernia sac was again reduced back into the intraabdominal cavity.  No femoral hernia.  Another large 3DMax mid-weight mesh was then brought in and secured in a similar fashion as to the right side.  Again, care was taken to avoid neurovascular bundles, triangle of pain, triangle of doom, and the cord structures.  Peritoneal flaps were then reapproximated with a running 3-0 V-Loc.  During closure, incorporation of the hernia sac up to the undersurface of the anterior abdominal wall again occurred.  Instruments were removed, robot undocked and I scrubbed back into the case.  The patient tolerated desufflation well.  Hemostasis was adequate.  Skin reapproximated with 4-0 Vicryl in a subcuticular fashion.  Closed incisions were then cleaned, dried, and glue placed.  Dry dressings placed.  Sponge, needle, and instrumentation counts correct at the end of the procedure.  The patient tolerated the procedure well, and was transferred to the    postanesthesia care unit in stable condition.          TARAS NIX MD      D:  03/14/2025 14:11:28     T:  03/14/2025 15:00:48     ASHISH/RAJI  Job #:  [id number]     Doc#:  [id number]

## 2025-03-17 ENCOUNTER — TELEPHONE (OUTPATIENT)
Dept: SURGERY | Age: 65
End: 2025-03-17

## 2025-03-17 NOTE — TELEPHONE ENCOUNTER
Post procedural phone call placed to patient. Patient utilizing prescribed medication with adequate control of pain. Patients follow up appointment verified and confirmed in chart. Time spent for patient questions. Patient to follow up with office as needed.

## 2025-04-01 NOTE — PROGRESS NOTES
[facility] [place] [facility]  [facility] GENERAL SURGERY  [address]  [facility] [address]  Dept: [phone]  Dept Fax: [phone]  Loc: [phone]    Visit Date: 4/2/2025    Jarad Barton is a 64 y.o. male who presents today for:  Chief Complaint   Patient presents with    Post-Op Check     S/P robotic repair of bilateral inguinal hernias with mesh 3/14/25       HPI:     HPI    Jarad is a 64-year old male patient who presents today for follow up status post robotic repair of bilateral inguinal hernias with mesh almost 3 weeks ago with Dr. Butt. Presents with wife. Doing well. Has some right groin discomfort/pain but only with bending over really. States pain has really improved over the last week or two. Off norco and toradol. Not taking anything OTC for discomfort.  Appetite back to normal. No nausea or vomiting. No fever, chills, or sweats. Abdominal incisions are healing well without signs of infection. Denies any scrotal swelling. Right testicular discomfort intermittently with a lot of walking. No groin swelling or tenderness. No issues urinating. Bowels are back to normal. No stool softener use. No SOB or chest pain. No lightheadedness or dizziness.     Past Medical History:   Diagnosis Date    Cancer (HCC)     skin, melanoma    Hyperlipidemia     Hypertension     sometimes    Inguinal hernia     Sleep apnea     Umbilical hernia 09/2020      Past Surgical History:   Procedure Laterality Date    CARDIAC PROCEDURE N/A 06/07/2024    Left heart cath / coronary angiography performed by Bee Aguayo MD at [facility] CARDIAC CATH LAB    CARDIAC PROCEDURE N/A 06/07/2024    Percutaneous coronary intervention performed by Mateusz Edwards MD at [facility] CARDIAC CATH LAB    [facility]    COLONOSCOPY  02/19/2021    Dr. Askew    HEMORRHOID SURGERY  2005    Dr Tena    HERNIA REPAIR Bilateral 3/14/2025    Robotic bilateral inguinal hernia

## 2025-04-02 ENCOUNTER — OFFICE VISIT (OUTPATIENT)
Dept: SURGERY | Age: 65
End: 2025-04-02

## 2025-04-02 VITALS
OXYGEN SATURATION: 95 % | TEMPERATURE: 98.5 F | SYSTOLIC BLOOD PRESSURE: 124 MMHG | HEART RATE: 66 BPM | HEIGHT: 69 IN | BODY MASS INDEX: 27.07 KG/M2 | DIASTOLIC BLOOD PRESSURE: 72 MMHG

## 2025-04-02 DIAGNOSIS — Z87.19 S/P BILATERAL INGUINAL HERNIA REPAIR: Primary | ICD-10-CM

## 2025-04-02 DIAGNOSIS — Z98.890 S/P BILATERAL INGUINAL HERNIA REPAIR: Primary | ICD-10-CM

## 2025-04-02 PROCEDURE — 99024 POSTOP FOLLOW-UP VISIT: CPT | Performed by: NURSE PRACTITIONER

## 2025-04-02 ASSESSMENT — ENCOUNTER SYMPTOMS
SINUS PRESSURE: 0
EYE ITCHING: 0
CHEST TIGHTNESS: 0
ABDOMINAL PAIN: 0
WHEEZING: 0
COLOR CHANGE: 0
DIARRHEA: 0
CHOKING: 0
APNEA: 0
PHOTOPHOBIA: 0
NAUSEA: 0
RHINORRHEA: 0
VOMITING: 0
COUGH: 0
BACK PAIN: 0
EYE PAIN: 0
ANAL BLEEDING: 0
SORE THROAT: 0
ALLERGIC/IMMUNOLOGIC NEGATIVE: 1
SHORTNESS OF BREATH: 0
ABDOMINAL DISTENTION: 0
BLOOD IN STOOL: 0
CONSTIPATION: 0
EYE DISCHARGE: 0

## 2025-04-02 NOTE — PATIENT INSTRUCTIONS
Continue wound care. Monitor for redness, swelling, or purulent drainage.     Maintain lifting restrictions for the full 6-8 weeks after surgery. No heavy lifting, pulling, or tugging greater than 20-25 lbs. Gradually ease into normal routine before lifting heavier objects.     Follow up as directed. Do not hesitate to call with any questions or concerns.

## 2025-04-28 ENCOUNTER — OFFICE VISIT (OUTPATIENT)
Dept: SURGERY | Age: 65
End: 2025-04-28

## 2025-04-28 VITALS
BODY MASS INDEX: 27.7 KG/M2 | OXYGEN SATURATION: 97 % | DIASTOLIC BLOOD PRESSURE: 70 MMHG | HEIGHT: 69 IN | WEIGHT: 187 LBS | TEMPERATURE: 97.2 F | SYSTOLIC BLOOD PRESSURE: 132 MMHG | RESPIRATION RATE: 18 BRPM | HEART RATE: 56 BPM

## 2025-04-28 DIAGNOSIS — Z98.890 S/P BILATERAL INGUINAL HERNIA REPAIR: Primary | ICD-10-CM

## 2025-04-28 DIAGNOSIS — Z87.19 S/P BILATERAL INGUINAL HERNIA REPAIR: Primary | ICD-10-CM

## 2025-04-28 PROCEDURE — 99024 POSTOP FOLLOW-UP VISIT: CPT | Performed by: NURSE PRACTITIONER

## 2025-04-28 NOTE — PROGRESS NOTES
[facility] [place] [facility]  [facility] GENERAL SURGERY  [address]  [facility] [address]  Dept: [phone]  Dept Fax: [phone]  Loc: [phone]    Visit Date: 4/28/2025    Jarad Barton is a 64 y.o. male who presents today for:  Chief Complaint   Patient presents with    Post-Op Check     S/P robotic repair of bilateral inguinal hernias with mesh 3/14/25-Last seen 4/02/25     HPI:     HPI    Jarad is a 64-year old male patient who presents today for follow up status post robotic repair of bilateral inguinal hernias with mesh 6 weeks ago with Dr. Butt. Presents alone today. Continues to do well. States he did some yard work over the week and still has discomfort to the right groin. States it is very manageable but just wants to ensure that is normal in the healing process. Pain still more with certain movements and soreness. Left side does not bother him at all. Not taking anything for pain. Off norco.  Appetite and bowel function normal. Abdominal incisions are healed well without signs of infection. Denies any scrotal swelling. Minimal right testicle discomfort. No groin swelling that he is aware of. No issues urinating. No SOB or chest pain. No lightheadedness or dizziness.     Past Medical History:   Diagnosis Date    Cancer (HCC)     skin, melanoma    Hyperlipidemia     Hypertension     sometimes    Inguinal hernia     Sleep apnea     Umbilical hernia 09/2020      Past Surgical History:   Procedure Laterality Date    CARDIAC PROCEDURE N/A 06/07/2024    Left heart cath / coronary angiography performed by Bee Aguayo MD at [facility] CARDIAC CATH LAB    CARDIAC PROCEDURE N/A 06/07/2024    Percutaneous coronary intervention performed by Mateusz Edwards MD at [facility] CARDIAC CATH LAB    [facility]    COLONOSCOPY  02/19/2021    Dr. Askew    HEMORRHOID SURGERY  2005    Dr Tena    HERNIA REPAIR Bilateral 3/14/2025    Robotic

## 2025-04-30 ASSESSMENT — ENCOUNTER SYMPTOMS
NAUSEA: 0
ABDOMINAL PAIN: 0
EYE ITCHING: 0
CHOKING: 0
EYE DISCHARGE: 0
DIARRHEA: 0
COUGH: 0
BLOOD IN STOOL: 0
SHORTNESS OF BREATH: 0
APNEA: 0
WHEEZING: 0
BACK PAIN: 0
CONSTIPATION: 0
PHOTOPHOBIA: 0
VOMITING: 0
SINUS PRESSURE: 0
COLOR CHANGE: 0
SORE THROAT: 0
ABDOMINAL DISTENTION: 0
ALLERGIC/IMMUNOLOGIC NEGATIVE: 1
ANAL BLEEDING: 0
EYE PAIN: 0
RHINORRHEA: 0
CHEST TIGHTNESS: 0

## 2025-04-30 NOTE — PROGRESS NOTES
[facility]      Jarad Barton         [id number]  5/1/2025   Chief Complaint   Patient presents with    Follow-up     1 year AWAIS f/u with [facility]         Pt of Dr. Glover    Assessment/Plan     Assessment & Plan  1. Sleep Apnea  His weight has remained stable over the past year, with a slight decrease of 3 pounds. His compliance with the CPAP machine is commendable at 97 percent, averaging just under 7 hours of use per night. The AutoSet pressure settings range from 8 to 16 cmH2O, but he typically requires an average pressure of 9.5, with the highest recorded pressure being 10. This indicates that the current pressure settings are adequate. His AHI count is well-regulated at 2.2, demonstrating excellent control. A supply order will be placed to address the mask leakage issue. He is advised to monitor his salt intake and identify any potential triggers for leg swelling. If the mask leakage persists despite the supply change, consideration will be given to adjusting the pressure setting to a fixed level of 9.     -Download reviewed and discussed with patient.  -The symptoms of AWAIS have improved. The patient is benefiting from therapy and should continue use of PAP device. I have reviewed the download.   -Advised to continue current positive airway pressure therapy with above described pressure.   -Advised to keep good compliance with current recommended pressure to get optimal results and clinical improvement  -Recommend 7-9 hours of sleep with PAP  -Instructed to call [facility] regarding supplies if needed.   -Patient to call my office for earlier appointment if needed for worsening of sleep symptoms.   -Patient is not to drive if feeling sleepy    Counseled patient on weight loss    Educated about my impression and plan. Patient verbalizes understanding.    Return in about 1 year (around 5/1/2026) for AWAIS. with download.    The patient (or guardian, if applicable) and

## 2025-05-01 ENCOUNTER — OFFICE VISIT (OUTPATIENT)
Age: 65
End: 2025-05-01
Payer: MEDICARE

## 2025-05-01 VITALS
SYSTOLIC BLOOD PRESSURE: 120 MMHG | OXYGEN SATURATION: 97 % | WEIGHT: 186.4 LBS | BODY MASS INDEX: 27.61 KG/M2 | TEMPERATURE: 98.3 F | HEIGHT: 69 IN | DIASTOLIC BLOOD PRESSURE: 64 MMHG | HEART RATE: 62 BPM

## 2025-05-01 DIAGNOSIS — G47.33 OSA ON CPAP: Primary | ICD-10-CM

## 2025-05-01 PROCEDURE — 3017F COLORECTAL CA SCREEN DOC REV: CPT

## 2025-05-01 PROCEDURE — 99213 OFFICE O/P EST LOW 20 MIN: CPT

## 2025-05-01 PROCEDURE — 1036F TOBACCO NON-USER: CPT

## 2025-05-01 PROCEDURE — G8419 CALC BMI OUT NRM PARAM NOF/U: HCPCS

## 2025-05-01 PROCEDURE — 1123F ACP DISCUSS/DSCN MKR DOCD: CPT

## 2025-05-01 PROCEDURE — 1160F RVW MEDS BY RX/DR IN RCRD: CPT

## 2025-05-01 PROCEDURE — 1159F MED LIST DOCD IN RCRD: CPT

## 2025-05-01 PROCEDURE — G8427 DOCREV CUR MEDS BY ELIG CLIN: HCPCS

## 2025-05-14 ENCOUNTER — OFFICE VISIT (OUTPATIENT)
Dept: CARDIOLOGY CLINIC | Age: 65
End: 2025-05-14
Payer: MEDICARE

## 2025-05-14 VITALS
HEIGHT: 69 IN | HEART RATE: 62 BPM | SYSTOLIC BLOOD PRESSURE: 132 MMHG | WEIGHT: 184 LBS | DIASTOLIC BLOOD PRESSURE: 78 MMHG | BODY MASS INDEX: 27.25 KG/M2

## 2025-05-14 DIAGNOSIS — I25.10 CORONARY ARTERY DISEASE INVOLVING NATIVE CORONARY ARTERY OF NATIVE HEART WITHOUT ANGINA PECTORIS: Primary | ICD-10-CM

## 2025-05-14 DIAGNOSIS — R07.2 PRECORDIAL PAIN: ICD-10-CM

## 2025-05-14 DIAGNOSIS — Z95.820 S/P ANGIOPLASTY WITH STENT: ICD-10-CM

## 2025-05-14 PROCEDURE — 1159F MED LIST DOCD IN RCRD: CPT | Performed by: NUCLEAR MEDICINE

## 2025-05-14 PROCEDURE — 1123F ACP DISCUSS/DSCN MKR DOCD: CPT | Performed by: NUCLEAR MEDICINE

## 2025-05-14 PROCEDURE — G8419 CALC BMI OUT NRM PARAM NOF/U: HCPCS | Performed by: NUCLEAR MEDICINE

## 2025-05-14 PROCEDURE — 99214 OFFICE O/P EST MOD 30 MIN: CPT | Performed by: NUCLEAR MEDICINE

## 2025-05-14 PROCEDURE — 93000 ELECTROCARDIOGRAM COMPLETE: CPT | Performed by: NUCLEAR MEDICINE

## 2025-05-14 PROCEDURE — 3017F COLORECTAL CA SCREEN DOC REV: CPT | Performed by: NUCLEAR MEDICINE

## 2025-05-14 PROCEDURE — 1036F TOBACCO NON-USER: CPT | Performed by: NUCLEAR MEDICINE

## 2025-05-14 PROCEDURE — G8427 DOCREV CUR MEDS BY ELIG CLIN: HCPCS | Performed by: NUCLEAR MEDICINE

## 2025-05-14 NOTE — PROGRESS NOTES
Patient here for follow up.  Patient states he already had hernia surgery in March.  Patient complains of getting lightheaded and dizziness.   Patient states had a little bit of pain on left side chest this morning.   EKG done today.

## 2025-05-14 NOTE — PROGRESS NOTES
[facility] [place] [facility]  [facility] CARDIOLOGY  [address]  [address]  [facility] [address]  Dept: [phone]  Dept Fax: [phone]  Loc: [phone]    Visit Date: 5/14/2025    Jarad Barton is a 64 y.o. male who presents todayfor:  Chief Complaint   Patient presents with    Follow-up         HPI:  63 yo male PMHx of CAD s/p PCI and DEX 6/24 HLD, HTN, AWAIS EF 55-60% 5/13/24. Last few weeks has had cp has some pain in 2-3 ivs somewhat sharp. Typically in the am. Some lightheadedness. When it comes on, he stops and relaxes. It eventually goes away. Doesn't notice worsens with anything. Lasts less then a minute.     Last Cholesterol was good, WNL.   /78  EKG today was much the same as a year ago with the exception of a lone PVC.  Patient did have surgery performed 3/14 which required him to be off of DAPT therapy for 7 days.      HPI  Past Medical History:   Diagnosis Date    Cancer (HCC)     skin, melanoma    Hyperlipidemia     Hypertension     sometimes    Inguinal hernia     Sleep apnea     Umbilical hernia 09/2020      Past Surgical History:   Procedure Laterality Date    CARDIAC PROCEDURE N/A 06/07/2024    Left heart cath / coronary angiography performed by Bee Aguayo MD at [facility] CARDIAC CATH LAB    CARDIAC PROCEDURE N/A 06/07/2024    Percutaneous coronary intervention performed by Mateusz Edwards MD at [facility] CARDIAC CATH LAB    [facility]    COLONOSCOPY  02/19/2021    Dr. Askew    HEMORRHOID SURGERY  2005    Dr Tena    HERNIA REPAIR Bilateral 03/14/2025    Robotic bilateral inguinal hernia repair with mesh performed by Richard Butt MD at [facility] OR    MOLE REMOVAL  03/14/2025    Mole removal on back per Dr Bynum    NASAL SEPTUM SURGERY  2000    UMBILICAL HERNIA REPAIR N/A 09/08/2020    INCARCERATED UMBILICAL HERNIA REPAIR WITH MESH performed by Richard Butt MD at [facility] OR     Family History   Problem Relation Age of

## 2025-06-11 ENCOUNTER — OFFICE VISIT (OUTPATIENT)
Dept: SURGERY | Age: 65
End: 2025-06-11
Payer: MEDICARE

## 2025-06-11 VITALS
HEIGHT: 69 IN | OXYGEN SATURATION: 97 % | SYSTOLIC BLOOD PRESSURE: 124 MMHG | WEIGHT: 182 LBS | HEART RATE: 70 BPM | DIASTOLIC BLOOD PRESSURE: 66 MMHG | BODY MASS INDEX: 26.96 KG/M2 | TEMPERATURE: 98.1 F

## 2025-06-11 DIAGNOSIS — Z98.890 S/P RIGHT INGUINAL HERNIA REPAIR: ICD-10-CM

## 2025-06-11 DIAGNOSIS — N50.811 RIGHT TESTICULAR PAIN: Primary | ICD-10-CM

## 2025-06-11 DIAGNOSIS — Z87.19 S/P RIGHT INGUINAL HERNIA REPAIR: ICD-10-CM

## 2025-06-11 DIAGNOSIS — R10.31 RIGHT GROIN PAIN: ICD-10-CM

## 2025-06-11 PROCEDURE — 3017F COLORECTAL CA SCREEN DOC REV: CPT | Performed by: NURSE PRACTITIONER

## 2025-06-11 PROCEDURE — 1159F MED LIST DOCD IN RCRD: CPT | Performed by: NURSE PRACTITIONER

## 2025-06-11 PROCEDURE — 1036F TOBACCO NON-USER: CPT | Performed by: NURSE PRACTITIONER

## 2025-06-11 PROCEDURE — G8427 DOCREV CUR MEDS BY ELIG CLIN: HCPCS | Performed by: NURSE PRACTITIONER

## 2025-06-11 PROCEDURE — G8419 CALC BMI OUT NRM PARAM NOF/U: HCPCS | Performed by: NURSE PRACTITIONER

## 2025-06-11 PROCEDURE — 1123F ACP DISCUSS/DSCN MKR DOCD: CPT | Performed by: NURSE PRACTITIONER

## 2025-06-11 PROCEDURE — 99212 OFFICE O/P EST SF 10 MIN: CPT | Performed by: NURSE PRACTITIONER

## 2025-06-11 NOTE — PROGRESS NOTES
[facility] [place] [facility]  [facility] GENERAL SURGERY  [address]  [facility] [address]  Dept: [phone]  Dept Fax: [phone]  Loc: [phone]    Visit Date: 6/11/2025    Jarad Barton is a 64 y.o. male who presents today for:  Chief Complaint   Patient presents with    Post-Op Check     Robotic repair of bilateral inguinal hernias with mesh (large 3DMax mid-weight mesh x2) 3/14/25. Last office visit 4/28/25 - Groin pain       HPI:     BOB Abraham is a 64-year-old male patient who presents today for right groin pain.  Patient had robotic repair of bilateral inguinal hernias March 14, 2025 with Dr. Butt.  He has pretty well been back to normal activity but has persistent right groin pain and right testicular discomfort.  Patient states it is only when he bends over more to the right side.  Feels that his right testicle is more undescended than the left.  He is unsure if this was like this prior to surgery.  No discoloration or extreme pain.  Denies any numbness or tingling.  Only notates right groin pain when he bends and feels that something is pinched and only has testicular discomfort with certain activity. Otherwise no abdominal pain. No left sided pain. Urinating without complaints. No scrotal swelling.    Past Medical History:   Diagnosis Date    Cancer (HCC)     skin, melanoma    Hyperlipidemia     Hypertension     sometimes    Inguinal hernia     Sleep apnea     Umbilical hernia 09/2020      Past Surgical History:   Procedure Laterality Date    CARDIAC PROCEDURE N/A 06/07/2024    Left heart cath / coronary angiography performed by Bee Aguayo MD at [facility] CARDIAC CATH LAB    CARDIAC PROCEDURE N/A 06/07/2024    Percutaneous coronary intervention performed by Mateusz Edwards MD at [facility] CARDIAC CATH LAB    [facility]    COLONOSCOPY  02/19/2021    Dr. Askew    HEMORRHOID SURGERY  2005    Dr Tena    HERNIA REPAIR

## 2025-06-23 ENCOUNTER — HOSPITAL ENCOUNTER (OUTPATIENT)
Dept: ULTRASOUND IMAGING | Age: 65
Discharge: HOME | End: 2025-06-23
Payer: MEDICARE

## 2025-06-23 ENCOUNTER — RESULTS FOLLOW-UP (OUTPATIENT)
Dept: SURGERY | Age: 65
End: 2025-06-23

## 2025-06-23 DIAGNOSIS — Z87.19 S/P RIGHT INGUINAL HERNIA REPAIR: ICD-10-CM

## 2025-06-23 DIAGNOSIS — Z98.890 S/P RIGHT INGUINAL HERNIA REPAIR: ICD-10-CM

## 2025-06-23 DIAGNOSIS — N50.811 RIGHT TESTICULAR PAIN: ICD-10-CM

## 2025-06-23 DIAGNOSIS — R10.31 RIGHT GROIN PAIN: ICD-10-CM

## 2025-06-23 PROCEDURE — 76870 US EXAM SCROTUM: CPT

## 2025-06-23 PROCEDURE — 76882 US LMTD JT/FCL EVL NVASC XTR: CPT

## 2025-06-23 NOTE — TELEPHONE ENCOUNTER
I called pt and informed him of this.  He will monitor for now and let us know how he is doing.  If pain gets worse, he will call sooner.

## 2025-06-23 NOTE — TELEPHONE ENCOUNTER
----- Message from LADARIUS Montoya CNP sent at 6/23/2025 10:39 AM EDT -----  Hey, can we call him and just let him know everything looks good. No scrotal swelling or concern with testicle. He very well may have a nerve that just seems to get pinched when he bends over. I would have him continue to monitor the pain and have him update us in 4-6 weeks unless it worsens. If no better then we could consider an injection like Dr. Sarmiento does.  ----- Message -----  From: Virginia Byrne Incoming Orders Results To [facility]  Sent: 6/23/2025   9:29 AM EDT  To: LADARIUS Noel CNP

## 2025-07-28 RX ORDER — TICAGRELOR 90 MG/1
90 TABLET, FILM COATED ORAL 2 TIMES DAILY
Qty: 180 TABLET | Refills: 3 | Status: SHIPPED | OUTPATIENT
Start: 2025-07-28

## 2025-07-28 RX ORDER — ROSUVASTATIN CALCIUM 20 MG/1
20 TABLET, COATED ORAL NIGHTLY
Qty: 90 TABLET | Refills: 3 | Status: SHIPPED | OUTPATIENT
Start: 2025-07-28

## (undated) DEVICE — GENERAL LAPAROSCOPY-LF: Brand: MEDLINE INDUSTRIES, INC.

## (undated) DEVICE — GOWN,SIRUS,NON REINFRCD,LARGE,SET IN SL: Brand: MEDLINE

## (undated) DEVICE — INSUFFLATION NEEDLE TO ESTABLISH PNEUMOPERITONEUM.: Brand: INSUFFLATION NEEDLE

## (undated) DEVICE — GLOVE ORANGE PI 8   MSG9080

## (undated) DEVICE — KIT DETRGNT ENZYMATIC SOLUTION 100 ML SOAK SHLD 5 VI LG HUMD

## (undated) DEVICE — BAND COMPR L24CM REG CLR PLAS HEMSTAT EXT HK AND LOOP RETEN

## (undated) DEVICE — CATHETER 5FR JR4 CORDIS 100CM

## (undated) DEVICE — SUTURE ETHIBOND EXCEL SZ 0 L36IN NONABSORBABLE GRN SH L26MM X524H

## (undated) DEVICE — TIBURON NEONATAL DRAPE: Brand: CONVERTORS

## (undated) DEVICE — BLADELESS OBTURATOR: Brand: WECK VISTA

## (undated) DEVICE — 260 CM J TIP WIRE .035

## (undated) DEVICE — BINDER ABD SM M W12IN CIRC 30 45IN 4 PNL E CNTCT CLSR POSTOP

## (undated) DEVICE — MEDTRONIC JL3.5 DIAGNOSTIC CATHETER

## (undated) DEVICE — GLOVE ORANGE PI 7   MSG9070

## (undated) DEVICE — BREAST HERNIA PACK: Brand: MEDLINE INDUSTRIES, INC.

## (undated) DEVICE — Device: Brand: NOMOLINE™ LH ADULT NASAL CO2 CANNULA WITH O2 4M

## (undated) DEVICE — GLIDESHEATH SLENDER NITINOL HYDROPHILIC COATED INTRODUCER SHEATH: Brand: GLIDESHEATH SLENDER

## (undated) DEVICE — SYRINGE MED 30ML STD CLR PLAS LUERLOCK TIP N CTRL DISP

## (undated) DEVICE — CATHETER GUID 6FR L100CM DIA0.071IN NYL SHFT EBU3.5

## (undated) DEVICE — ELECTRO LUBE IS A SINGLE PATIENT USE DEVICE THAT IS INTENDED TO BE USED ON ELECTROSURGICAL ELECTRODES TO REDUCE STICKING.: Brand: KEY SURGICAL ELECTRO LUBE

## (undated) DEVICE — ARM DRAPE

## (undated) DEVICE — BINDER ABD UNISX 4 PNL PREM 6INX6INX12IN L XL 4

## (undated) DEVICE — CARDIAC CATH LAB PACK LF

## (undated) DEVICE — TUBING INSUFFLATOR HEAT HUMIDIFIED SMK EVAC SET PNEUMOCLEAR

## (undated) DEVICE — COLUMN DRAPE

## (undated) DEVICE — SEAL

## (undated) DEVICE — TIP COVER ACCESSORY

## (undated) DEVICE — CATH BLLN ANGIO 2.50X12MM SC EUPHORA RX

## (undated) DEVICE — CATH BLLN ANGIO 3X12MM NC EUPHORIA RX

## (undated) DEVICE — GLOVE SURG SZ 65 THK91MIL LTX FREE SYN POLYISOPRENE

## (undated) DEVICE — DRAPE KIT RAMAPR RADIATION SHIELD

## (undated) DEVICE — RUNTHROUGH NS EXTRA FLOPPY PTCA GUIDEWIRE: Brand: RUNTHROUGH

## (undated) DEVICE — BASIC SINGLE BASIN BTC-LF: Brand: MEDLINE INDUSTRIES, INC.

## (undated) DEVICE — SUTURE V-LOC 180 SZ 3-0 L9IN ABSRB GRN L26MM V-20 1/2 CIR VLOCL0644

## (undated) DEVICE — GLOVE ORTHO 7 1/2   MSG9475

## (undated) DEVICE — VALVE HEMSTAS W/ GWIRE INSRTN TOOL GRDIAN II NC

## (undated) DEVICE — YANKAUER,BULB TIP,W/O VENT,RIGID,STERILE: Brand: MEDLINE

## (undated) DEVICE — LINER SUCT CANSTR 1500CC SEMI RIG W/ POR HYDROPHOBIC SHUT

## (undated) DEVICE — LIQUIBAND RAPID ADHESIVE 36/CS 0.8ML: Brand: MEDLINE